# Patient Record
Sex: FEMALE | Race: BLACK OR AFRICAN AMERICAN | Employment: FULL TIME | URBAN - METROPOLITAN AREA
[De-identification: names, ages, dates, MRNs, and addresses within clinical notes are randomized per-mention and may not be internally consistent; named-entity substitution may affect disease eponyms.]

---

## 2017-03-23 ENCOUNTER — HOSPITAL ENCOUNTER (OUTPATIENT)
Dept: LAB | Age: 27
Discharge: HOME | End: 2017-03-23

## 2017-03-23 LAB — SENTARA SPECIMEN COL,SENBCF: NORMAL

## 2017-03-23 PROCEDURE — 99001 SPECIMEN HANDLING PT-LAB: CPT | Performed by: OBSTETRICS & GYNECOLOGY

## 2017-04-03 ENCOUNTER — ANESTHESIA EVENT (OUTPATIENT)
Dept: SURGERY | Age: 27
End: 2017-04-03
Payer: COMMERCIAL

## 2017-04-03 NOTE — H&P
Chief Complaint:      Diagnosis:  Squamous cell carcinoma of the cervix. Lupus. Hx of Stroke. Treatment:  12/15/14:  Cervical biopsy reveals squamous cell carcinoma, depth of invasion cannot be accurately determined. 12/24/14:  CT chest, abdomen, and pelvis with contrast revealing a cervical mass, 2 tiny ovoid hypodensities right kidney probably representing renal cysts, probable involuting cyst in the left ovary, no acute findings otherwise. 12/30/14 - EUA reveals infiltrating tumor measuring 5cm transversely and 7cm AP dimension. Status post 5 cycles Cisplatin 40mg/m2 with concomitant radiation therapy. Completed 3/20/15.  6/2/2015- EUA with cervical and vaginal biopsies, pathology negative for malignancy. 12/14/2015 colposcopy and biopsy revealing no evidence of malignancy, atypia related to radiation affect. 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 Diagnostic Studies:  03/04/15 - MRI of the pelvis reveals normal appearing cervix. No evidence of regional metastatic disease. Interval History:  Kami Farnsworth presents to the office today for follow up regarding her known history of squamous cell carcinoma of the cervix. She had completed chemotherapy and radiation in March 2015. Her last pap smear was normal.  At that last visit, she had slight irritation around the opening of the vagina, which she states has been present for five months. She was given Diflucan 100mg times 30 days which did not relieve her symptoms. She continues to have tenderness and itching on a daily basis. She denies any abdominal discomforts. Her bowel and bladder function remains normal.    Past Medical History:    Hypertension  Systemic lupus erythematosis  Stroke    Past Surgical History:  12/30/14 - EUA reveals infiltrating tumor measuring 5cm transversely and 7cm AP dimension.   EUA with cervical and vaginal biopsies 6/2/2015  Health Maintenance:  Screenings: Pap Smear on 11/28/2016; Pelvic Exam on 11/28/2016  Immunizations: Not given  Never smoker    Review of Systems:  Constitutional: No weight loss, no fever, no chills, no night sweats, energy level good. Cardiovascular:no chest pain, no palpitations, no syncope, no upper extremity edema, no lower extremity edema, no calf discomfort. Respiratory:  no cough, no hemoptysis, no dyspnea, no pleurisy, no wheezing. Breast:  no breast mass, no pain, no nipple discharge, no change in size, no change in shape. Gastrointestinal:  no abdominal pain, no nausea, no vomiting, no constipation, no hematochezia, no jaundice, no abdominal cramping, no hematemesis, no diarrhea, no melena, no dyspepsia, no dysphagia. Female urinary:  no dysuria, no hematuria, no nocturia, no urinary incontinence. Gynecological:  no vaginal discharge, no suprapubic pain, no abnormal vaginal bleeding, irritation and discomfort at opening to vagina, decreased libido. Musculoskeletal: no muscle pain, no swollen joints, no joint redness, no bone pain, no spine tenderness. Neurologic:  no confusion, no seizures, no syncope, no tremor, no speech change, no headache, no hiccups, no abnormal gait, no weakness, no sensory changes. Psychiatric:  no depression, no anxiety, concentration normal.  Endocrine:  no polyuria, no polydipsia, no thyroid disease symptoms, no hot flashes. Hematologic:  no epistaxis, no gingival bleeding, no petechiae, no ecchymosis. Lymphatic:  no lymphadenopathy, no lymphedema. Ob/Gyn History:    Family History:  Father: Prostate cancer; Mother: Breast cancer    Social History:      Vital Signs:  Vital signs:  HT: 61 in, WT: 198.5 lb,  Blood pressure: 126/74, Pulse: 105, Temperature: 98.2 F, Respirations: 17, O2 sat: 98%, Pain Scale: 0, Height: 59 in, Weight: 198.5 lb, BSA: 1.94, BMI: 40.09 kg/m2, BMI: 40.09 kg/m2    Physical Exam:    Constitutional:  normal appearance, no acute distress. Eyes:  conjunctivae normal, eyelids normal, PERRLA, anicteric.   Ears: nose, throat:  external ears and nose normal, hearing grossly normal, oropharynx unremarkable. Neck:  supple, no masses. Respiratory:  breathing comfortably, lungs clear to auscultation and percussion. Cardiovascular:  normal heart sounds, heart rhythm regular, no peripheral edema. Abdomen: no ascites, no masses, no tenderness, no hepatomegaly, no splenomegaly. Pelvic: Slight irregularity on the overlying skin involving the right labia minora, possible condyloma. Radiation change in the vagina pap smearobtained  Smooth fibrosis on bimanual exam.  No masses or nodularity on bimanual exam.  Accompanied by:  Female staff member. Rectal:  Anal sphincter tone is normal.  No rectal lesions are noted. Lymph nodes:  no cervical adenopathy, no axillary adenopathy, no supraclavicular adenopathy. Musculoskeletal:  normal muscle strength. Neurologic:  no focal motor deficit, normal gait, no abnormal mental status. Psychiatric:  oriented to person, time and place, mood and affect appropriate to situation, appropriate judgement and insight, memory intact. Assessment:  Slight Irregularity with persistent itching and burning involving the right labia minora not relieved with Diflucan        Plan:  1. I discussed the current situation in detail and I have recommended WLE of the affected area given continued pressence of irregularity involving the right labia minora and discomfort. 2.       I recommended that we make plans to go to the operating room for WLE of the affected area. She will need Lovenox 1.5mg/kg 5 days prior to the procedure and then bridge back to Coumadin. 3.       The details, risks, and postoperative recovery were reviewed as well as the possible need for additional intervention depending upon the final pathology report. Her questions were answered to her apparent satisfaction and we will proceed in the near future.     4.       I have reviewed the potential risks including risk of anesthesia, bleeding, and infection. She is in agreement with this plan and we will proceed in the near future. Laboratory:  (12/14/2015) WBC x 10^3/uL : 8.56; RBC x 10^6/uL : 3.85 (L); HGB g/dL : 10.2 (L); HCT % : 33.4 (L); MCV fL : 87; MCH pg : 26.4 (L); MCHC g/dL : 30.4 (L); RDW % : 14.7; PLT x 10^3/uL : 239; MPV fL : 5.1; Nicolasa % : 85.2 (H); LY % : 6.9 (L); MO % : 7.34; EO % : 0.08; BA % : 0.48; Nicolasa # (ANC) x 10^3/uL : 7.3 (H); LY # x 10^3/uL : 0.59 (L); MO # x 10^3/uL : 0.63; EO # x 10^3/uL : 0.01; BA # x 10^3/uL : 0.04  PT sec : 33.4 (H); INR : 2.8 (H); PT/INR indication/reason : recheck pt/inr on friday; Target INR : 2-3; Current Coumadin dose : coumadin on hold x3 days; New Coumadin dose : 7.5mg daily  (11/18/2016) PT INR lab result note : recheck in 1 week  (12/04/2013) aPTT sec : 25.8; APTT 1:1 normal plasma sec : 25.7; APTT-LA sec : 33.3; APTT 1:1 mix saline sec : 28.1; APTT 1:1 NP mix, 60 min, incub sec : 26.3; APTT 1:1 NP incub. mix control sec : 26.2; Dilute Dylon viper venom time sec : 29.1; Fibrinogen, quantitative mg/dL : 714 (H); Antithrombin III activity % : 135; Antithrombin III assay, antigenic % : 123; Activated protein C ratio : 2.6; Protein S, functional % : 67; Protein S, free % : 68; Protein S, total % : 124; Protein C, functional % : 159 (H); Protein C, total % : 163 (H); Lupus anticoagulant interpretation : COMMENT:  (12/14/2015) Glucose mg/dL : 98; BUN mg/dL : 21; Creatinine mg/dL : 0.9; Sodium mmol/L : 140; Potassium mmol/L : 4.3; Chloride mmol/L : 102; CO2 mmol/L : 24; Anion gap, mmol/L : 14; Calcium mg/dL : 9; Albumin g/dL : 3.9; Total protein g/dL : 6.5; Globulin g/dL : 2.6; A/G ratio : 1.5; Bilirubin, total mg/dL : <0.1 (L); Alkaline phosphatase U/L : 65; AST/SGOT U/L : 11;  ALT/SGPT U/L : 11; GFR non-African American, estimated mL/min/1.73m2 : >60.0; GFR African American, estimated mL/min/1.73m2 : >60.0  (10/17/2014) GFR estimate mL/min/1.73m2 : >60.0  (08/13/2014) BUN/Creatinine ratio : 19.3  (06/09/2014) GFR, estimated, iSTAT, non  adult mL/min/1.73m2 : 103  (12/04/2013) LDH U/L : 223 (H); Uric acid mg/dL : 9.3 (H); Phosphorus mg/dL : 5 (H)  (10/17/2014) Beta HCG, serum, quant mIU/mL : <1  (10/16/2015) Color (ua) : Yellow (Comments: Ref. Range: Straw, yellow, deborah); Appearance (ua) : Clear (Comments: Ref. Range: clear); Glucose (ua) : Negative (Comments: Ref. Range: Negative); Bilirubin (ua) : Negative (Comments: Ref. Range: Negative); Ketone (ua) : Negative (Comments: Ref. Range: Negative); Specific gravity (ua) : 1.025; Blood (ua) : Negative (Comments: Ref. Range: Negative); pH (ua) : 6; Protein (ua) : 300 (A) (Comments: Ref. Range: Negative); Urobilinogen (ua) : 0.2; Nitrite (ua) : Negative (Comments: Ref. Range: Negative); Leukocyte esterase (ua), qual : Negative (Comments: Ref. Range: Negative)  (06/09/2014) Protein, 24 hr urine, mg/24hr : 2302.5 (H); Creatinine, random urine mg/dL : 58.6 (Comments: TOTAL VOLUME: 2500 ML); Protein, total, random urine mg/dL : 92.1 (H) (Comments: TOTAL VOLUME: 2500 ML); Creatinine, 24 hr urine g/24hr : 1465 (Comments: TOTAL VOLUME: 2500 ML); Creatinine clearance mL/min : 127 (Comments: THE ABOVE RANGE IS BASED ON 1.73 SQUARE METER AVERAGE BODY SURFACE AREA.); Albumin, UPE % : 72.8; Alpha 1 globulin, UPE % : 4.8; Alpha 2 globulin, UPE % : 3.8; Beta globulin, UPE % : 14.9; Gamma globulin, UPE % : 3.7; M Serge %, UPE, 24 hr % : NOT OBSERVED (Comments: Units: %, . Ref. Range: NOT OBSERVED, TOTAL VOLUME: 2500 ML);  Immunofixation, 24 hr urine : COMMENT (Comments: AN APPARENT NORMAL IMMUNOFIXATION PATTERN.)    Current Medications:  CellCept (Mycophenolate Oral)  Coumadin (Warfarin Oral)  Diflucan (Fluconazole Oral)  Diflucan (Fluconazole Oral)  Estrace (Estradiol Vaginal Cream 0.01 % (0.1 mg/g))  Hydroxychloroquine Oral  Omeprazole Oral Delayed Release Capsule  Acetaminophen-Caff-Butalbital Oral 325 mg-40 mg-50 mg  Cyclobenzaprine Oral  Metoprolol Oral (Tartrate)  Multivitamins Oral  Mycophenolate Oral  Prednisone Oral  Simvastatin Oral  Telmisartan Oral  Valsartan Oral      Allergies & Adverse Reactions:  metronidazole  Nitrofuran Analogues  Vancomycin Analogues  Nitroimidazole Derivatives  Cephalexin  Cephalosporins  VANCOMYCIN HCL  NITROFURANTOIN MONOHYDRATE/MACROCRYSTALS    Problem List:  Antiphospholipid syndrome (disorder)  Malignant tumor of cervix (disorder)  Abscess of abdominal wall  Candida of vagina  Cerebrovascular accident (disorder)  Cervicovaginal cytology: High grade squamous intraepithelial lesion or carcinoma (finding)  Dehydration (disorder)    New Orders:  03/13/2017, RTC MD, Perform Date: Prior to Next Visit, Instructions: Post op 2 weeks after 4/4/17 surgery    Quiana East NP    Other Physicians:  Lanie Mcpherson MD, Randi Israel MD (Rheumatology), Marcel Iqbal MD (Cardiology), Vern Leary MD (Neurology), Carrillo Stevenson MD (Radiation Oncology)      Send copy of note to:  MD Marcel Reyes MD Synetta Headings, MD Paulo Hives, MD Cathye Graver, MD        Electronically signed by Rodolfo Zaragoza MD 03/20/2017 12:56 EDT

## 2017-04-04 ENCOUNTER — HOSPITAL ENCOUNTER (OUTPATIENT)
Age: 27
Setting detail: OUTPATIENT SURGERY
Discharge: HOME | End: 2017-04-04
Attending: OBSTETRICS & GYNECOLOGY | Admitting: OBSTETRICS & GYNECOLOGY
Payer: COMMERCIAL

## 2017-04-04 ENCOUNTER — ANESTHESIA (OUTPATIENT)
Dept: SURGERY | Age: 27
End: 2017-04-04
Payer: COMMERCIAL

## 2017-04-04 VITALS
SYSTOLIC BLOOD PRESSURE: 160 MMHG | RESPIRATION RATE: 16 BRPM | DIASTOLIC BLOOD PRESSURE: 65 MMHG | OXYGEN SATURATION: 99 % | TEMPERATURE: 97.5 F | HEIGHT: 59 IN | BODY MASS INDEX: 39.31 KG/M2 | HEART RATE: 70 BPM | WEIGHT: 195 LBS

## 2017-04-04 PROBLEM — N90.89 VULVAR LESION: Status: ACTIVE | Noted: 2017-04-04

## 2017-04-04 LAB
ATRIAL RATE: 79 BPM
CALCULATED P AXIS, ECG09: 17 DEGREES
CALCULATED R AXIS, ECG10: 15 DEGREES
CALCULATED T AXIS, ECG11: 19 DEGREES
DIAGNOSIS, 93000: NORMAL
HCG UR QL: NEGATIVE
P-R INTERVAL, ECG05: 136 MS
Q-T INTERVAL, ECG07: 358 MS
QRS DURATION, ECG06: 82 MS
QTC CALCULATION (BEZET), ECG08: 410 MS
VENTRICULAR RATE, ECG03: 79 BPM

## 2017-04-04 PROCEDURE — 88309 TISSUE EXAM BY PATHOLOGIST: CPT | Performed by: OBSTETRICS & GYNECOLOGY

## 2017-04-04 PROCEDURE — 74011000250 HC RX REV CODE- 250

## 2017-04-04 PROCEDURE — 76210000017 HC OR PH I REC 1.5 TO 2 HR: Performed by: OBSTETRICS & GYNECOLOGY

## 2017-04-04 PROCEDURE — 77030034850: Performed by: OBSTETRICS & GYNECOLOGY

## 2017-04-04 PROCEDURE — 77030031139 HC SUT VCRL2 J&J -A: Performed by: OBSTETRICS & GYNECOLOGY

## 2017-04-04 PROCEDURE — 77030010938 HC CLP LIG TELE -A: Performed by: OBSTETRICS & GYNECOLOGY

## 2017-04-04 PROCEDURE — 74011000250 HC RX REV CODE- 250: Performed by: OBSTETRICS & GYNECOLOGY

## 2017-04-04 PROCEDURE — 74011250636 HC RX REV CODE- 250/636: Performed by: NURSE ANESTHETIST, CERTIFIED REGISTERED

## 2017-04-04 PROCEDURE — 74011000258 HC RX REV CODE- 258: Performed by: OBSTETRICS & GYNECOLOGY

## 2017-04-04 PROCEDURE — 77030032490 HC SLV COMPR SCD KNE COVD -B: Performed by: OBSTETRICS & GYNECOLOGY

## 2017-04-04 PROCEDURE — 74011250636 HC RX REV CODE- 250/636

## 2017-04-04 PROCEDURE — 77030010516 HC APPL HEMA CLP TELE -B: Performed by: OBSTETRICS & GYNECOLOGY

## 2017-04-04 PROCEDURE — 77030018702 HC CLP LIG TI TELE -A: Performed by: OBSTETRICS & GYNECOLOGY

## 2017-04-04 PROCEDURE — 77030003028 HC SUT VCRL J&J -A: Performed by: OBSTETRICS & GYNECOLOGY

## 2017-04-04 PROCEDURE — 76060000032 HC ANESTHESIA 0.5 TO 1 HR: Performed by: OBSTETRICS & GYNECOLOGY

## 2017-04-04 PROCEDURE — 93005 ELECTROCARDIOGRAM TRACING: CPT

## 2017-04-04 PROCEDURE — 77030002982 HC SUT POLYSRB J&J -A: Performed by: OBSTETRICS & GYNECOLOGY

## 2017-04-04 PROCEDURE — 77030012414: Performed by: OBSTETRICS & GYNECOLOGY

## 2017-04-04 PROCEDURE — 77030003029 HC SUT VCRL J&J -B: Performed by: OBSTETRICS & GYNECOLOGY

## 2017-04-04 PROCEDURE — 77030008462 HC STPLR SKN PROX J&J -A: Performed by: OBSTETRICS & GYNECOLOGY

## 2017-04-04 PROCEDURE — 77030002996 HC SUT SLK J&J -A: Performed by: OBSTETRICS & GYNECOLOGY

## 2017-04-04 PROCEDURE — 77030011248 HC DRN WND RESERV CARD -A: Performed by: OBSTETRICS & GYNECOLOGY

## 2017-04-04 PROCEDURE — 81025 URINE PREGNANCY TEST: CPT

## 2017-04-04 PROCEDURE — 88305 TISSUE EXAM BY PATHOLOGIST: CPT | Performed by: OBSTETRICS & GYNECOLOGY

## 2017-04-04 PROCEDURE — 76210000020 HC REC RM PH II FIRST 0.5 HR: Performed by: OBSTETRICS & GYNECOLOGY

## 2017-04-04 PROCEDURE — 74011250636 HC RX REV CODE- 250/636: Performed by: ANESTHESIOLOGY

## 2017-04-04 PROCEDURE — 77030018846 HC SOL IRR STRL H20 ICUM -A: Performed by: OBSTETRICS & GYNECOLOGY

## 2017-04-04 PROCEDURE — 77030010507 HC ADH SKN DERMBND J&J -B: Performed by: OBSTETRICS & GYNECOLOGY

## 2017-04-04 PROCEDURE — 77030011265 HC ELECTRD BLD HEX COVD -A: Performed by: OBSTETRICS & GYNECOLOGY

## 2017-04-04 PROCEDURE — 77030012510 HC MSK AIRWY LMA TELE -B: Performed by: ANESTHESIOLOGY

## 2017-04-04 PROCEDURE — 74011250637 HC RX REV CODE- 250/637: Performed by: NURSE ANESTHETIST, CERTIFIED REGISTERED

## 2017-04-04 PROCEDURE — 76010000138 HC OR TIME 0.5 TO 1 HR: Performed by: OBSTETRICS & GYNECOLOGY

## 2017-04-04 PROCEDURE — 77030018836 HC SOL IRR NACL ICUM -A: Performed by: OBSTETRICS & GYNECOLOGY

## 2017-04-04 RX ORDER — INSULIN LISPRO 100 [IU]/ML
INJECTION, SOLUTION INTRAVENOUS; SUBCUTANEOUS ONCE
Status: DISCONTINUED | OUTPATIENT
Start: 2017-04-05 | End: 2017-04-04 | Stop reason: HOSPADM

## 2017-04-04 RX ORDER — ONDANSETRON 2 MG/ML
INJECTION INTRAMUSCULAR; INTRAVENOUS AS NEEDED
Status: DISCONTINUED | OUTPATIENT
Start: 2017-04-04 | End: 2017-04-04 | Stop reason: HOSPADM

## 2017-04-04 RX ORDER — FENTANYL CITRATE 50 UG/ML
25 INJECTION, SOLUTION INTRAMUSCULAR; INTRAVENOUS
Status: DISCONTINUED | OUTPATIENT
Start: 2017-04-04 | End: 2017-04-04 | Stop reason: HOSPADM

## 2017-04-04 RX ORDER — OXYCODONE AND ACETAMINOPHEN 5; 325 MG/1; MG/1
1 TABLET ORAL
Qty: 30 TAB | Refills: 0 | Status: SHIPPED | OUTPATIENT
Start: 2017-04-04 | End: 2020-10-20

## 2017-04-04 RX ORDER — BUPIVACAINE HYDROCHLORIDE AND EPINEPHRINE 5; 5 MG/ML; UG/ML
INJECTION, SOLUTION EPIDURAL; INTRACAUDAL; PERINEURAL AS NEEDED
Status: DISCONTINUED | OUTPATIENT
Start: 2017-04-04 | End: 2017-04-04 | Stop reason: HOSPADM

## 2017-04-04 RX ORDER — LIDOCAINE HYDROCHLORIDE 20 MG/ML
INJECTION, SOLUTION EPIDURAL; INFILTRATION; INTRACAUDAL; PERINEURAL AS NEEDED
Status: DISCONTINUED | OUTPATIENT
Start: 2017-04-04 | End: 2017-04-04 | Stop reason: HOSPADM

## 2017-04-04 RX ORDER — KETOROLAC TROMETHAMINE 30 MG/ML
INJECTION, SOLUTION INTRAMUSCULAR; INTRAVENOUS AS NEEDED
Status: DISCONTINUED | OUTPATIENT
Start: 2017-04-04 | End: 2017-04-04 | Stop reason: HOSPADM

## 2017-04-04 RX ORDER — FENTANYL CITRATE 50 UG/ML
INJECTION, SOLUTION INTRAMUSCULAR; INTRAVENOUS AS NEEDED
Status: DISCONTINUED | OUTPATIENT
Start: 2017-04-04 | End: 2017-04-04 | Stop reason: HOSPADM

## 2017-04-04 RX ORDER — SODIUM CHLORIDE, SODIUM LACTATE, POTASSIUM CHLORIDE, CALCIUM CHLORIDE 600; 310; 30; 20 MG/100ML; MG/100ML; MG/100ML; MG/100ML
50 INJECTION, SOLUTION INTRAVENOUS CONTINUOUS
Status: DISCONTINUED | OUTPATIENT
Start: 2017-04-04 | End: 2017-04-04 | Stop reason: HOSPADM

## 2017-04-04 RX ORDER — CLINDAMYCIN PHOSPHATE 150 MG/ML
INJECTION, SOLUTION INTRAVENOUS
Status: DISCONTINUED
Start: 2017-04-04 | End: 2017-04-04 | Stop reason: HOSPADM

## 2017-04-04 RX ORDER — GENTAMICIN SULFATE 60 MG/50ML
120 INJECTION, SOLUTION INTRAVENOUS ONCE
Status: DISCONTINUED | OUTPATIENT
Start: 2017-04-04 | End: 2017-04-04 | Stop reason: HOSPADM

## 2017-04-04 RX ORDER — GENTAMICIN SULFATE 60 MG/50ML
INJECTION, SOLUTION INTRAVENOUS
Status: DISCONTINUED
Start: 2017-04-04 | End: 2017-04-04

## 2017-04-04 RX ORDER — MIDAZOLAM HYDROCHLORIDE 1 MG/ML
INJECTION, SOLUTION INTRAMUSCULAR; INTRAVENOUS AS NEEDED
Status: DISCONTINUED | OUTPATIENT
Start: 2017-04-04 | End: 2017-04-04 | Stop reason: HOSPADM

## 2017-04-04 RX ORDER — FAMOTIDINE 20 MG/1
20 TABLET, FILM COATED ORAL ONCE
Status: COMPLETED | OUTPATIENT
Start: 2017-04-05 | End: 2017-04-04

## 2017-04-04 RX ORDER — DEXAMETHASONE SODIUM PHOSPHATE 4 MG/ML
INJECTION, SOLUTION INTRA-ARTICULAR; INTRALESIONAL; INTRAMUSCULAR; INTRAVENOUS; SOFT TISSUE AS NEEDED
Status: DISCONTINUED | OUTPATIENT
Start: 2017-04-04 | End: 2017-04-04 | Stop reason: HOSPADM

## 2017-04-04 RX ORDER — ONDANSETRON 2 MG/ML
4 INJECTION INTRAMUSCULAR; INTRAVENOUS
Status: COMPLETED | OUTPATIENT
Start: 2017-04-04 | End: 2017-04-04

## 2017-04-04 RX ORDER — PROPOFOL 10 MG/ML
INJECTION, EMULSION INTRAVENOUS AS NEEDED
Status: DISCONTINUED | OUTPATIENT
Start: 2017-04-04 | End: 2017-04-04 | Stop reason: HOSPADM

## 2017-04-04 RX ORDER — DIPHENHYDRAMINE HYDROCHLORIDE 50 MG/ML
25 INJECTION, SOLUTION INTRAMUSCULAR; INTRAVENOUS
Status: DISCONTINUED | OUTPATIENT
Start: 2017-04-04 | End: 2017-04-04 | Stop reason: HOSPADM

## 2017-04-04 RX ORDER — HYDROMORPHONE HYDROCHLORIDE 2 MG/ML
0.5 INJECTION, SOLUTION INTRAMUSCULAR; INTRAVENOUS; SUBCUTANEOUS
Status: DISCONTINUED | OUTPATIENT
Start: 2017-04-04 | End: 2017-04-04 | Stop reason: HOSPADM

## 2017-04-04 RX ORDER — SODIUM CHLORIDE, SODIUM LACTATE, POTASSIUM CHLORIDE, CALCIUM CHLORIDE 600; 310; 30; 20 MG/100ML; MG/100ML; MG/100ML; MG/100ML
100 INJECTION, SOLUTION INTRAVENOUS CONTINUOUS
Status: DISCONTINUED | OUTPATIENT
Start: 2017-04-05 | End: 2017-04-04 | Stop reason: HOSPADM

## 2017-04-04 RX ORDER — FAMOTIDINE 20 MG/1
TABLET, FILM COATED ORAL
Status: DISCONTINUED
Start: 2017-04-04 | End: 2017-04-04 | Stop reason: HOSPADM

## 2017-04-04 RX ORDER — SODIUM CHLORIDE 900 MG/100ML
INJECTION INTRAVENOUS
Status: DISCONTINUED
Start: 2017-04-04 | End: 2017-04-04 | Stop reason: HOSPADM

## 2017-04-04 RX ORDER — OXYCODONE AND ACETAMINOPHEN 5; 325 MG/1; MG/1
1 TABLET ORAL AS NEEDED
Status: DISCONTINUED | OUTPATIENT
Start: 2017-04-04 | End: 2017-04-04 | Stop reason: HOSPADM

## 2017-04-04 RX ADMIN — SODIUM CHLORIDE, SODIUM LACTATE, POTASSIUM CHLORIDE, AND CALCIUM CHLORIDE 100 ML/HR: 600; 310; 30; 20 INJECTION, SOLUTION INTRAVENOUS at 10:18

## 2017-04-04 RX ADMIN — ONDANSETRON 4 MG: 2 INJECTION INTRAMUSCULAR; INTRAVENOUS at 10:54

## 2017-04-04 RX ADMIN — ONDANSETRON 4 MG: 2 INJECTION INTRAMUSCULAR; INTRAVENOUS at 11:32

## 2017-04-04 RX ADMIN — FENTANYL CITRATE 50 MCG: 50 INJECTION, SOLUTION INTRAMUSCULAR; INTRAVENOUS at 10:27

## 2017-04-04 RX ADMIN — FENTANYL CITRATE 50 MCG: 50 INJECTION, SOLUTION INTRAMUSCULAR; INTRAVENOUS at 10:54

## 2017-04-04 RX ADMIN — DEXAMETHASONE SODIUM PHOSPHATE 4 MG: 4 INJECTION, SOLUTION INTRA-ARTICULAR; INTRALESIONAL; INTRAMUSCULAR; INTRAVENOUS; SOFT TISSUE at 10:27

## 2017-04-04 RX ADMIN — FAMOTIDINE 20 MG: 20 TABLET, FILM COATED ORAL at 10:11

## 2017-04-04 RX ADMIN — LIDOCAINE HYDROCHLORIDE 60 MG: 20 INJECTION, SOLUTION EPIDURAL; INFILTRATION; INTRACAUDAL; PERINEURAL at 10:27

## 2017-04-04 RX ADMIN — PROPOFOL 200 MG: 10 INJECTION, EMULSION INTRAVENOUS at 10:27

## 2017-04-04 RX ADMIN — FENTANYL CITRATE 50 MCG: 50 INJECTION, SOLUTION INTRAMUSCULAR; INTRAVENOUS at 11:00

## 2017-04-04 RX ADMIN — MIDAZOLAM HYDROCHLORIDE 2 MG: 1 INJECTION, SOLUTION INTRAMUSCULAR; INTRAVENOUS at 10:23

## 2017-04-04 RX ADMIN — MEPERIDINE HYDROCHLORIDE 12.5 MG: 50 INJECTION, SOLUTION INTRAMUSCULAR; INTRAVENOUS; SUBCUTANEOUS at 11:32

## 2017-04-04 RX ADMIN — SODIUM CHLORIDE 900 MG: 900 INJECTION, SOLUTION INTRAVENOUS at 10:30

## 2017-04-04 RX ADMIN — FENTANYL CITRATE 50 MCG: 50 INJECTION, SOLUTION INTRAMUSCULAR; INTRAVENOUS at 10:45

## 2017-04-04 RX ADMIN — FENTANYL CITRATE 50 MCG: 50 INJECTION, SOLUTION INTRAMUSCULAR; INTRAVENOUS at 10:30

## 2017-04-04 RX ADMIN — KETOROLAC TROMETHAMINE 30 MG: 30 INJECTION, SOLUTION INTRAMUSCULAR; INTRAVENOUS at 11:03

## 2017-04-04 NOTE — DISCHARGE INSTRUCTIONS
DISCHARGE SUMMARY from Nurse    The following personal items are in your possession at time of discharge:    Dental Appliances: None  Visual Aid: With patient     Home Medications: None  Jewelry: None  Clothing: Shirt, Pajamas, Footwear                PATIENT INSTRUCTIONS:    After general anesthesia or intravenous sedation, for 24 hours or while taking prescription Narcotics:  · Limit your activities  · Do not drive and operate hazardous machinery  · Do not make important personal or business decisions  · Do  not drink alcoholic beverages  · If you have not urinated within 8 hours after discharge, please contact your surgeon on call. Report the following to your surgeon:  · Excessive pain, swelling, redness or odor of or around the surgical area  · Temperature over 100.5  · Nausea and vomiting lasting longer than 4 hours or if unable to take medications  · Any signs of decreased circulation or nerve impairment to extremity: change in color, persistent  numbness, tingling, coldness or increase pain  · Any questions        What to do at Home:  Recommended activity: Activity as tolerated and no driving for today. These are general instructions for a healthy lifestyle:    No smoking/ No tobacco products/ Avoid exposure to second hand smoke    Surgeon General's Warning:  Quitting smoking now greatly reduces serious risk to your health. Obesity, smoking, and sedentary lifestyle greatly increases your risk for illness    A healthy diet, regular physical exercise & weight monitoring are important for maintaining a healthy lifestyle    You may be retaining fluid if you have a history of heart failure or if you experience any of the following symptoms:  Weight gain of 3 pounds or more overnight or 5 pounds in a week, increased swelling in our hands or feet or shortness of breath while lying flat in bed.   Please call your doctor as soon as you notice any of these symptoms; do not wait until your next office visit. Recognize signs and symptoms of STROKE:    F-face looks uneven    A-arms unable to move or move unevenly    S-speech slurred or non-existent    T-time-call 911 as soon as signs and symptoms begin-DO NOT go       Back to bed or wait to see if you get better-TIME IS BRAIN. Warning Signs of HEART ATTACK     Call 911 if you have these symptoms:   Chest discomfort. Most heart attacks involve discomfort in the center of the chest that lasts more than a few minutes, or that goes away and comes back. It can feel like uncomfortable pressure, squeezing, fullness, or pain.  Discomfort in other areas of the upper body. Symptoms can include pain or discomfort in one or both arms, the back, neck, jaw, or stomach.  Shortness of breath with or without chest discomfort.  Other signs may include breaking out in a cold sweat, nausea, or lightheadedness. Don't wait more than five minutes to call 911 - MINUTES MATTER! Fast action can save your life. Calling 911 is almost always the fastest way to get lifesaving treatment. Emergency Medical Services staff can begin treatment when they arrive -- up to an hour sooner than if someone gets to the hospital by car. The discharge information has been reviewed with the patient. The patient verbalized understanding. Discharge medications reviewed with the patient and appropriate educational materials and side effects teaching were provided. Patient armband removed and given to patient to take home.   Patient was informed of the privacy risks if armband lost or stolen

## 2017-04-04 NOTE — ANESTHESIA POSTPROCEDURE EVALUATION
Post-Anesthesia Evaluation and Assessment    Patient: Justo Mahoney MRN: [id number]  SSN: [id number]    YOB: 1990  Age: 27 y.o. Sex: female       Cardiovascular Function/Vital Signs  Visit Vitals    /55    Pulse 100    Temp 36.4 °C (97.5 °F)    Resp 23    Ht 4' 11\" (1.499 m)    Wt 88.5 kg (195 lb)    SpO2 92%    BMI 39.39 kg/m2       Patient is status post general anesthesia for Procedure(s):  wide local excision of vulva. Nausea/Vomiting: None    Postoperative hydration reviewed and adequate. Pain:  Pain Scale 1: Numeric (0 - 10) (04/04/17 1138)  Pain Intensity 1: 0 (04/04/17 1138)   Managed    Neurological Status:   Neuro (WDL): Within Defined Limits (04/04/17 1138)   At baseline    Mental Status and Level of Consciousness: Arousable    Pulmonary Status:   O2 Device: Room air (04/04/17 1138)   Adequate oxygenation and airway patent    Complications related to anesthesia: None    Post-anesthesia assessment completed.  No concerns    Signed By: Gaviota Wade MD     April 4, 2017

## 2017-04-04 NOTE — IP AVS SNAPSHOT
[address] [address] Gill Muller  
[phone] Patient: Azucena Rodriguez MRN: [id number] :1990 You are allergic to the following Allergen Reactions Flagyl (Metronidazole) Nausea Only Keflex (Cephalexin) Rash Macrobid (Nitrofurantoin Monohyd/M-Cryst) Nausea Only Vancomycin Nausea Only Recent Documentation Height Weight BMI OB Status Smoking Status 1.499 m 88.5 kg 39.39 kg/m2 Menopause Never Smoker Emergency Contacts Name Discharge Info Relation Home Work Mobile Shay Syed DISCHARGE CAREGIVER [3] Spouse [3]   [phone] About your hospitalization You were admitted on:  2017 You last received care in the:  [facility] PHASE 2 RECOVERY You were discharged on:  2017 Unit phone number:  [phone] Why you were hospitalized Your primary diagnosis was:  Not on File Your diagnoses also included:  Vulvar Lesion Providers Seen During Your Hospitalizations Provider Role Specialty Primary office phone Alyssa Baron MD Attending Provider Gynecologic Oncology [phone] Your Primary Care Physician (PCP) Primary Care Physician Office Phone Office Fax Jeanette Salmeron [phone] Follow-up Information Follow up With Details Comments Contact Info Nirmala Styles MD   [address] [address] [address] [facility] [place] [address] 
[phone] Current Discharge Medication List  
  
CONTINUE these medications which have CHANGED Dose & Instructions Dispensing Information Comments Morning Noon Evening Bedtime * oxyCODONE-acetaminophen 5-325 mg per tablet Commonly known as:  PERCOCET What changed:  Another medication with the same name was added. Make sure you understand how and when to take each. Your last dose was: Your next dose is:    
   
   
 Dose:  1 Tab Take 1 Tab by mouth every four (4) hours as needed for Pain. Max Daily Amount: 6 Tabs. Quantity:  24 Tab Refills:  0  
     
   
   
   
  
 * oxyCODONE-acetaminophen 5-325 mg per tablet Commonly known as:  PERCOCET What changed: You were already taking a medication with the same name, and this prescription was added. Make sure you understand how and when to take each. Your last dose was: Your next dose is:    
   
   
 Dose:  1 Tab Take 1 Tab by mouth every four (4) hours as needed for Pain. Max Daily Amount: 6 Tabs. Quantity:  30 Tab Refills:  0  
     
   
   
   
  
 * Notice: This list has 2 medication(s) that are the same as other medications prescribed for you. Read the directions carefully, and ask your doctor or other care provider to review them with you. CONTINUE these medications which have NOT CHANGED Dose & Instructions Dispensing Information Comments Morning Noon Evening Bedtime  
 amLODIPine 5 mg tablet Commonly known as:  Celi Rodgers Your last dose was: Your next dose is:    
   
   
 Dose:  5 mg Take 5 mg by mouth daily. Refills:  0  
     
   
   
   
  
 enoxaparin 40 mg/0.4 mL Commonly known as:  LOVENOX Your last dose was: Your next dose is:    
   
   
 Dose:  40 mg  
40 mg by SubCUTAneous route daily. Refills:  0  
     
   
   
   
  
 mycophenolate 500 mg tablet Commonly known as:  CELLCEPT Your last dose was: Your next dose is:    
   
   
 Dose:  500 mg Take 500 mg by mouth two (2) times a day. Refills:  0  
     
   
   
   
  
 predniSONE 10 mg tablet Commonly known as:  Myranda Owens Your last dose was: Your next dose is:    
   
   
 Dose:  8 mg Take 8 mg by mouth daily (with breakfast). Refills:  0  
     
   
   
   
  
 simvastatin 20 mg tablet Commonly known as:  ZOCOR Your last dose was: Your next dose is: Take  by mouth nightly. Refills:  0  
     
   
   
   
  
 valsartan 160 mg tablet Commonly known as:  DIOVAN Your last dose was: Your next dose is: Take  by mouth daily. Refills:  0  
     
   
   
   
  
 warfarin 6 mg tablet Commonly known as:  COUMADIN Your last dose was: Your next dose is:    
   
   
 Dose:  7.5 mg Take 7.5 mg by mouth daily. Refills:  0 Where to Get Your Medications Information on where to get these meds will be given to you by the nurse or doctor. ! Ask your nurse or doctor about these medications  
  oxyCODONE-acetaminophen 5-325 mg per tablet Discharge Instructions DISCHARGE SUMMARY from Nurse The following personal items are in your possession at time of discharge: 
 
Dental Appliances: None Visual Aid: With patient Home Medications: None Jewelry: None Clothing: Juan Haymaker, Footwear PATIENT INSTRUCTIONS: 
 
After general anesthesia or intravenous sedation, for 24 hours or while taking prescription Narcotics: · Limit your activities · Do not drive and operate hazardous machinery · Do not make important personal or business decisions · Do  not drink alcoholic beverages · If you have not urinated within 8 hours after discharge, please contact your surgeon on call. Report the following to your surgeon: 
· Excessive pain, swelling, redness or odor of or around the surgical area · Temperature over 100.5 · Nausea and vomiting lasting longer than 4 hours or if unable to take medications · Any signs of decreased circulation or nerve impairment to extremity: change in color, persistent  numbness, tingling, coldness or increase pain · Any questions What to do at Home: 
Recommended activity: Activity as tolerated and no driving for today. These are general instructions for a healthy lifestyle: No smoking/ No tobacco products/ Avoid exposure to second hand smoke Surgeon General's Warning:  Quitting smoking now greatly reduces serious risk to your health. Obesity, smoking, and sedentary lifestyle greatly increases your risk for illness A healthy diet, regular physical exercise & weight monitoring are important for maintaining a healthy lifestyle You may be retaining fluid if you have a history of heart failure or if you experience any of the following symptoms:  Weight gain of 3 pounds or more overnight or 5 pounds in a week, increased swelling in our hands or feet or shortness of breath while lying flat in bed. Please call your doctor as soon as you notice any of these symptoms; do not wait until your next office visit. Recognize signs and symptoms of STROKE: 
 
F-face looks uneven A-arms unable to move or move unevenly S-speech slurred or non-existent T-time-call 911 as soon as signs and symptoms begin-DO NOT go Back to bed or wait to see if you get better-TIME IS BRAIN. Warning Signs of HEART ATTACK Call 911 if you have these symptoms: 
? Chest discomfort. Most heart attacks involve discomfort in the center of the chest that lasts more than a few minutes, or that goes away and comes back. It can feel like uncomfortable pressure, squeezing, fullness, or pain. ? Discomfort in other areas of the upper body. Symptoms can include pain or discomfort in one or both arms, the back, neck, jaw, or stomach. ? Shortness of breath with or without chest discomfort. ? Other signs may include breaking out in a cold sweat, nausea, or lightheadedness. Don't wait more than five minutes to call [address]! Fast action can save your life. Calling 911 is almost always the fastest way to get lifesaving treatment. Emergency Medical Services staff can begin treatment when they arrive  up to an hour sooner than if someone gets to the hospital by car. The discharge information has been reviewed with the patient. The patient verbalized understanding. Discharge medications reviewed with the patient and appropriate educational materials and side effects teaching were provided. Patient armband removed and given to patient to take home. Patient was informed of the privacy risks if armband lost or stolen Discharge Orders None Introducing [facility] SERVICES! Fawn Cuadra introduces [employer] patient portal. Now you can access parts of your medical record, email your doctor's office, and request medication refills online. 1. In your internet browser, go to [email or web address] 2. Click on the First Time User? Click Here link in the Sign In box. You will see the New Member Sign Up page. 3. Enter your [employer] Access Code exactly as it appears below. You will not need to use this code after youve completed the sign-up process. If you do not sign up before the expiration date, you must request a new code. · [employer] Access Code: [email or web address] Expires: 6/21/2017  3:34 PM 
 
4. Enter the last four digits of your Social Security Number (xxxx) and Date of Birth (mm/dd/yyyy) as indicated and click Submit. You will be taken to the next sign-up page. 5. Create a [employer] ID. This will be your [employer] login ID and cannot be changed, so think of one that is secure and easy to remember. 6. Create a [employer] password. You can change your password at any time. 7. Enter your Password Reset Question and Answer. This can be used at a later time if you forget your password. 8. Enter your e-mail address. You will receive e-mail notification when new information is available in [address] 9. Click Sign Up. You can now view and download portions of your medical record. 10. Click the Download Summary menu link to download a portable copy of your medical information.  
 
If you have questions, please visit the Frequently Asked Questions section of the [facility]. Remember, MyChart is NOT to be used for urgent needs. For medical emergencies, dial 911. Now available from your iPhone and Android! General Information Please provide this summary of care documentation to your next provider. Patient Signature:  ____________________________________________________________ Date:  ____________________________________________________________  
  
Lajuanda Rachell Provider Signature:  ____________________________________________________________ Date:  ____________________________________________________________

## 2017-04-04 NOTE — PERIOP NOTES
1108  Patient received in PACU and connected to monitors. Vital signs stable. RN at bedside. Will continue to monitor. 1132  Demerol given for rigors per MAR.    1142  Shaking has subsided. 1145  Pt taking ice chips.

## 2017-04-04 NOTE — PERIOP NOTES
1150  Assumed care of pt. Update for lunch relief given by Oralia Parry RN.     8059  Family updated by Oralia Parry RN.

## 2017-04-04 NOTE — ANESTHESIA PREPROCEDURE EVALUATION
Anesthetic History   No history of anesthetic complications            Review of Systems / Medical History  Patient summary reviewed and pertinent labs reviewed    Pulmonary  Within defined limits            Pertinent negatives: No smoker     Neuro/Psych       CVA  TIA     Cardiovascular    Hypertension                   GI/Hepatic/Renal  Within defined limits              Endo/Other  Within defined limits           Other Findings   Comments:   Risk Factors for Postoperative nausea/vomiting:       History of postoperative nausea/vomiting? NO       Female? YES       Motion sickness? NO       Intended opioid administration for postoperative analgesia?   NO           Physical Exam    Airway  Mallampati: II  TM Distance: 4 - 6 cm  Neck ROM: normal range of motion   Mouth opening: Normal     Cardiovascular  Regular rate and rhythm,  S1 and S2 normal,  no murmur, click, rub, or gallop             Dental  No notable dental hx       Pulmonary  Breath sounds clear to auscultation               Abdominal  Abdominal exam normal       Other Findings            Anesthetic Plan    ASA: 3  Anesthesia type: general          Induction: Intravenous  Anesthetic plan and risks discussed with: Patient

## 2017-04-04 NOTE — OP NOTES
Operative Report    Patient: Felicia Katz MRN: [id number]  SSN: [id number]    YOB: 1990  Age: 27 y.o. Sex: female       Date of Surgery: 4/4/2017     Preoperative Diagnosis:    right vulvar lesion     Postoperative Diagnosis:    right vulvar lesion     Procedure: Procedure(s):  wide local excision of vulva    Specimens:    ID Type Source Tests Collected by Time Destination   1 : right labia minora     Lamar Courtney MD 4/4/2017 1049 Pathology       Surgeon: Lamar Courtney MD    Assistant: MASOUD Villanueva, present and scrubbed to assist with exposure    Anesthesia: General    Complications: none    Estimated Blood Loss: minimal    Findings: 2cm slightly raised lesion occupying the right labia minora    Technique: After the patient was identified in the operating room, she was placed under general anesthesia by the anesthesia team. She was sterilely prepped and draped in the lithotomy position. The lesion was outlined with a marking pen, creating an ellipse with a 5-10mm visible margin circumferentially. The area was liberally infiltrated with 0.5% marcaine. The skin was incised with a scalpel. Electrocautery was used to dissect the subcutaneous tissue maintaining a 5mm deep margin. The specimen was sent to pathology. Hemostasis was achieved with electrocautery. The wound was closed with interrupted inverted 3-0 vicryl. Dermabond was applied. The patient was then awakened and transported to the recovery room in stable condition. All needle, sponge, and instrument counts were noted to be correct at the end of the case.           Signed By:  Lamar Courtney MD     April 4, 2017

## 2017-04-04 NOTE — ANESTHESIA POSTPROCEDURE EVALUATION
Post-Anesthesia Evaluation and Assessment    Patient: Zan Stone MRN: [id number]  SSN: [id number]    YOB: 1990  Age: 27 y.o. Sex: female       Cardiovascular Function/Vital Signs  Visit Vitals    /55    Pulse 100    Temp 36.4 °C (97.5 °F)    Resp 23    Ht 4' 11\" (1.499 m)    Wt 88.5 kg (195 lb)    SpO2 92%    BMI 39.39 kg/m2       Patient is status post general anesthesia for Procedure(s):  wide local excision of vulva. Nausea/Vomiting: None    Postoperative hydration reviewed and adequate. Pain:  Pain Scale 1: Numeric (0 - 10) (04/04/17 1138)  Pain Intensity 1: 0 (04/04/17 1138)   Managed    Neurological Status:   Neuro (WDL): Within Defined Limits (04/04/17 1138)   At baseline    Mental Status and Level of Consciousness: Arousable    Pulmonary Status:   O2 Device: Room air (04/04/17 1138)   Adequate oxygenation and airway patent    Complications related to anesthesia: None    Post-anesthesia assessment completed.  No concerns    Signed By: Maggi Denney CRNA     April 4, 2017

## 2017-09-19 ENCOUNTER — ANESTHESIA EVENT (OUTPATIENT)
Dept: SURGERY | Age: 27
End: 2017-09-19
Payer: COMMERCIAL

## 2017-09-20 ENCOUNTER — ANESTHESIA (OUTPATIENT)
Dept: SURGERY | Age: 27
End: 2017-09-20
Payer: COMMERCIAL

## 2017-09-20 ENCOUNTER — HOSPITAL ENCOUNTER (OUTPATIENT)
Age: 27
Setting detail: OUTPATIENT SURGERY
Discharge: HOME | End: 2017-09-20
Attending: STUDENT IN AN ORGANIZED HEALTH CARE EDUCATION/TRAINING PROGRAM | Admitting: STUDENT IN AN ORGANIZED HEALTH CARE EDUCATION/TRAINING PROGRAM
Payer: COMMERCIAL

## 2017-09-20 VITALS
TEMPERATURE: 98.8 F | HEIGHT: 59 IN | DIASTOLIC BLOOD PRESSURE: 69 MMHG | HEART RATE: 105 BPM | OXYGEN SATURATION: 96 % | WEIGHT: 183 LBS | RESPIRATION RATE: 20 BRPM | BODY MASS INDEX: 36.89 KG/M2 | SYSTOLIC BLOOD PRESSURE: 117 MMHG

## 2017-09-20 LAB
ANION GAP SERPL CALC-SCNC: 9 MMOL/L (ref 3–18)
BUN SERPL-MCNC: 23 MG/DL (ref 7–18)
BUN/CREAT SERPL: 23 (ref 12–20)
CALCIUM SERPL-MCNC: 8.7 MG/DL (ref 8.5–10.1)
CHLORIDE SERPL-SCNC: 107 MMOL/L (ref 100–108)
CO2 SERPL-SCNC: 27 MMOL/L (ref 21–32)
CREAT SERPL-MCNC: 1.02 MG/DL (ref 0.6–1.3)
GLUCOSE BLD STRIP.AUTO-MCNC: 77 MG/DL (ref 70–110)
GLUCOSE SERPL-MCNC: 83 MG/DL (ref 74–99)
HCG UR QL: NEGATIVE
POTASSIUM SERPL-SCNC: 4.2 MMOL/L (ref 3.5–5.5)
SODIUM SERPL-SCNC: 143 MMOL/L (ref 136–145)

## 2017-09-20 PROCEDURE — 74011000258 HC RX REV CODE- 258

## 2017-09-20 PROCEDURE — 74011250636 HC RX REV CODE- 250/636

## 2017-09-20 PROCEDURE — 77030011265 HC ELECTRD BLD HEX COVD -A: Performed by: STUDENT IN AN ORGANIZED HEALTH CARE EDUCATION/TRAINING PROGRAM

## 2017-09-20 PROCEDURE — 80048 BASIC METABOLIC PNL TOTAL CA: CPT | Performed by: NURSE ANESTHETIST, CERTIFIED REGISTERED

## 2017-09-20 PROCEDURE — 77030032490 HC SLV COMPR SCD KNE COVD -B: Performed by: STUDENT IN AN ORGANIZED HEALTH CARE EDUCATION/TRAINING PROGRAM

## 2017-09-20 PROCEDURE — 76060000034 HC ANESTHESIA 1.5 TO 2 HR: Performed by: STUDENT IN AN ORGANIZED HEALTH CARE EDUCATION/TRAINING PROGRAM

## 2017-09-20 PROCEDURE — 77030002888 HC SUT CHRMC J&J -A: Performed by: STUDENT IN AN ORGANIZED HEALTH CARE EDUCATION/TRAINING PROGRAM

## 2017-09-20 PROCEDURE — 77030011640 HC PAD GRND REM COVD -A: Performed by: STUDENT IN AN ORGANIZED HEALTH CARE EDUCATION/TRAINING PROGRAM

## 2017-09-20 PROCEDURE — 76210000017 HC OR PH I REC 1.5 TO 2 HR: Performed by: STUDENT IN AN ORGANIZED HEALTH CARE EDUCATION/TRAINING PROGRAM

## 2017-09-20 PROCEDURE — 74011250636 HC RX REV CODE- 250/636: Performed by: NURSE ANESTHETIST, CERTIFIED REGISTERED

## 2017-09-20 PROCEDURE — 76010000153 HC OR TIME 1.5 TO 2 HR: Performed by: STUDENT IN AN ORGANIZED HEALTH CARE EDUCATION/TRAINING PROGRAM

## 2017-09-20 PROCEDURE — 76210000021 HC REC RM PH II 0.5 TO 1 HR: Performed by: STUDENT IN AN ORGANIZED HEALTH CARE EDUCATION/TRAINING PROGRAM

## 2017-09-20 PROCEDURE — 82962 GLUCOSE BLOOD TEST: CPT

## 2017-09-20 PROCEDURE — 77030018836 HC SOL IRR NACL ICUM -A: Performed by: STUDENT IN AN ORGANIZED HEALTH CARE EDUCATION/TRAINING PROGRAM

## 2017-09-20 PROCEDURE — 81025 URINE PREGNANCY TEST: CPT

## 2017-09-20 PROCEDURE — 74011000250 HC RX REV CODE- 250

## 2017-09-20 PROCEDURE — 74011000250 HC RX REV CODE- 250: Performed by: STUDENT IN AN ORGANIZED HEALTH CARE EDUCATION/TRAINING PROGRAM

## 2017-09-20 PROCEDURE — 77030020782 HC GWN BAIR PAWS FLX 3M -B: Performed by: STUDENT IN AN ORGANIZED HEALTH CARE EDUCATION/TRAINING PROGRAM

## 2017-09-20 PROCEDURE — 82947 ASSAY GLUCOSE BLOOD QUANT: CPT

## 2017-09-20 PROCEDURE — 77030018846 HC SOL IRR STRL H20 ICUM -A: Performed by: STUDENT IN AN ORGANIZED HEALTH CARE EDUCATION/TRAINING PROGRAM

## 2017-09-20 PROCEDURE — 74011250637 HC RX REV CODE- 250/637: Performed by: NURSE ANESTHETIST, CERTIFIED REGISTERED

## 2017-09-20 RX ORDER — GLYCOPYRROLATE 0.2 MG/ML
INJECTION INTRAMUSCULAR; INTRAVENOUS AS NEEDED
Status: DISCONTINUED | OUTPATIENT
Start: 2017-09-20 | End: 2017-09-20 | Stop reason: HOSPADM

## 2017-09-20 RX ORDER — ONDANSETRON 2 MG/ML
4 INJECTION INTRAMUSCULAR; INTRAVENOUS ONCE
Status: COMPLETED | OUTPATIENT
Start: 2017-09-20 | End: 2017-09-20

## 2017-09-20 RX ORDER — SODIUM CHLORIDE, SODIUM LACTATE, POTASSIUM CHLORIDE, CALCIUM CHLORIDE 600; 310; 30; 20 MG/100ML; MG/100ML; MG/100ML; MG/100ML
INJECTION, SOLUTION INTRAVENOUS
Status: DISCONTINUED | OUTPATIENT
Start: 2017-09-20 | End: 2017-09-20 | Stop reason: HOSPADM

## 2017-09-20 RX ORDER — SUCCINYLCHOLINE CHLORIDE 20 MG/ML
INJECTION INTRAMUSCULAR; INTRAVENOUS AS NEEDED
Status: DISCONTINUED | OUTPATIENT
Start: 2017-09-20 | End: 2017-09-20 | Stop reason: HOSPADM

## 2017-09-20 RX ORDER — LIDOCAINE HYDROCHLORIDE 10 MG/ML
0.1 INJECTION, SOLUTION EPIDURAL; INFILTRATION; INTRACAUDAL; PERINEURAL AS NEEDED
Status: DISCONTINUED | OUTPATIENT
Start: 2017-09-20 | End: 2017-09-20 | Stop reason: HOSPADM

## 2017-09-20 RX ORDER — HYDROCORTISONE SODIUM SUCCINATE 100 MG/2ML
INJECTION, POWDER, FOR SOLUTION INTRAMUSCULAR; INTRAVENOUS AS NEEDED
Status: DISCONTINUED | OUTPATIENT
Start: 2017-09-20 | End: 2017-09-20 | Stop reason: HOSPADM

## 2017-09-20 RX ORDER — LABETALOL HYDROCHLORIDE 5 MG/ML
INJECTION, SOLUTION INTRAVENOUS AS NEEDED
Status: DISCONTINUED | OUTPATIENT
Start: 2017-09-20 | End: 2017-09-20 | Stop reason: HOSPADM

## 2017-09-20 RX ORDER — PROPOFOL 10 MG/ML
INJECTION, EMULSION INTRAVENOUS AS NEEDED
Status: DISCONTINUED | OUTPATIENT
Start: 2017-09-20 | End: 2017-09-20 | Stop reason: HOSPADM

## 2017-09-20 RX ORDER — SODIUM CHLORIDE 0.9 % (FLUSH) 0.9 %
5-10 SYRINGE (ML) INJECTION EVERY 8 HOURS
Status: DISCONTINUED | OUTPATIENT
Start: 2017-09-20 | End: 2017-09-20 | Stop reason: HOSPADM

## 2017-09-20 RX ORDER — ONDANSETRON 2 MG/ML
INJECTION INTRAMUSCULAR; INTRAVENOUS AS NEEDED
Status: DISCONTINUED | OUTPATIENT
Start: 2017-09-20 | End: 2017-09-20 | Stop reason: HOSPADM

## 2017-09-20 RX ORDER — SODIUM CHLORIDE, SODIUM LACTATE, POTASSIUM CHLORIDE, CALCIUM CHLORIDE 600; 310; 30; 20 MG/100ML; MG/100ML; MG/100ML; MG/100ML
75 INJECTION, SOLUTION INTRAVENOUS CONTINUOUS
Status: DISCONTINUED | OUTPATIENT
Start: 2017-09-20 | End: 2017-09-20 | Stop reason: HOSPADM

## 2017-09-20 RX ORDER — MIDAZOLAM HYDROCHLORIDE 1 MG/ML
INJECTION, SOLUTION INTRAMUSCULAR; INTRAVENOUS AS NEEDED
Status: DISCONTINUED | OUTPATIENT
Start: 2017-09-20 | End: 2017-09-20 | Stop reason: HOSPADM

## 2017-09-20 RX ORDER — FAMOTIDINE 20 MG/1
20 TABLET, FILM COATED ORAL ONCE
Status: COMPLETED | OUTPATIENT
Start: 2017-09-20 | End: 2017-09-20

## 2017-09-20 RX ORDER — ONDANSETRON 2 MG/ML
INJECTION INTRAMUSCULAR; INTRAVENOUS
Status: COMPLETED
Start: 2017-09-20 | End: 2017-09-20

## 2017-09-20 RX ORDER — NALOXONE HYDROCHLORIDE 0.4 MG/ML
0.1 INJECTION, SOLUTION INTRAMUSCULAR; INTRAVENOUS; SUBCUTANEOUS ONCE
Status: DISCONTINUED | OUTPATIENT
Start: 2017-09-20 | End: 2017-09-20 | Stop reason: HOSPADM

## 2017-09-20 RX ORDER — DEXAMETHASONE SODIUM PHOSPHATE 4 MG/ML
INJECTION, SOLUTION INTRA-ARTICULAR; INTRALESIONAL; INTRAMUSCULAR; INTRAVENOUS; SOFT TISSUE AS NEEDED
Status: DISCONTINUED | OUTPATIENT
Start: 2017-09-20 | End: 2017-09-20 | Stop reason: HOSPADM

## 2017-09-20 RX ORDER — ESMOLOL HYDROCHLORIDE 10 MG/ML
INJECTION INTRAVENOUS AS NEEDED
Status: DISCONTINUED | OUTPATIENT
Start: 2017-09-20 | End: 2017-09-20 | Stop reason: HOSPADM

## 2017-09-20 RX ORDER — INSULIN LISPRO 100 [IU]/ML
INJECTION, SOLUTION INTRAVENOUS; SUBCUTANEOUS ONCE
Status: DISCONTINUED | OUTPATIENT
Start: 2017-09-20 | End: 2017-09-20 | Stop reason: HOSPADM

## 2017-09-20 RX ORDER — SODIUM CHLORIDE 0.9 % (FLUSH) 0.9 %
5-10 SYRINGE (ML) INJECTION AS NEEDED
Status: DISCONTINUED | OUTPATIENT
Start: 2017-09-20 | End: 2017-09-20 | Stop reason: HOSPADM

## 2017-09-20 RX ORDER — PROMETHAZINE HYDROCHLORIDE 25 MG/ML
25 INJECTION, SOLUTION INTRAMUSCULAR; INTRAVENOUS
Status: DISCONTINUED | OUTPATIENT
Start: 2017-09-20 | End: 2017-09-20 | Stop reason: HOSPADM

## 2017-09-20 RX ORDER — HYDROMORPHONE HYDROCHLORIDE 2 MG/ML
0.5 INJECTION, SOLUTION INTRAMUSCULAR; INTRAVENOUS; SUBCUTANEOUS
Status: DISCONTINUED | OUTPATIENT
Start: 2017-09-20 | End: 2017-09-20 | Stop reason: HOSPADM

## 2017-09-20 RX ORDER — FENTANYL CITRATE 50 UG/ML
INJECTION, SOLUTION INTRAMUSCULAR; INTRAVENOUS AS NEEDED
Status: DISCONTINUED | OUTPATIENT
Start: 2017-09-20 | End: 2017-09-20 | Stop reason: HOSPADM

## 2017-09-20 RX ORDER — BUPIVACAINE HYDROCHLORIDE AND EPINEPHRINE 5; 5 MG/ML; UG/ML
INJECTION, SOLUTION EPIDURAL; INTRACAUDAL; PERINEURAL AS NEEDED
Status: DISCONTINUED | OUTPATIENT
Start: 2017-09-20 | End: 2017-09-20 | Stop reason: HOSPADM

## 2017-09-20 RX ORDER — LIDOCAINE HYDROCHLORIDE 20 MG/ML
INJECTION, SOLUTION EPIDURAL; INFILTRATION; INTRACAUDAL; PERINEURAL AS NEEDED
Status: DISCONTINUED | OUTPATIENT
Start: 2017-09-20 | End: 2017-09-20 | Stop reason: HOSPADM

## 2017-09-20 RX ADMIN — FENTANYL CITRATE 50 MCG: 50 INJECTION, SOLUTION INTRAMUSCULAR; INTRAVENOUS at 12:02

## 2017-09-20 RX ADMIN — LABETALOL HYDROCHLORIDE 5 MG: 5 INJECTION, SOLUTION INTRAVENOUS at 11:37

## 2017-09-20 RX ADMIN — SUCCINYLCHOLINE CHLORIDE 140 MG: 20 INJECTION INTRAMUSCULAR; INTRAVENOUS at 10:42

## 2017-09-20 RX ADMIN — FENTANYL CITRATE 50 MCG: 50 INJECTION, SOLUTION INTRAMUSCULAR; INTRAVENOUS at 10:42

## 2017-09-20 RX ADMIN — PROPOFOL 200 MG: 10 INJECTION, EMULSION INTRAVENOUS at 10:40

## 2017-09-20 RX ADMIN — HYDROCORTISONE SODIUM SUCCINATE 100 MG: 100 INJECTION, POWDER, FOR SOLUTION INTRAMUSCULAR; INTRAVENOUS at 10:58

## 2017-09-20 RX ADMIN — FENTANYL CITRATE 50 MCG: 50 INJECTION, SOLUTION INTRAMUSCULAR; INTRAVENOUS at 10:37

## 2017-09-20 RX ADMIN — HYDROMORPHONE HYDROCHLORIDE 0.5 MG: 2 INJECTION, SOLUTION INTRAMUSCULAR; INTRAVENOUS; SUBCUTANEOUS at 13:19

## 2017-09-20 RX ADMIN — HYDROMORPHONE HYDROCHLORIDE 0.5 MG: 2 INJECTION, SOLUTION INTRAMUSCULAR; INTRAVENOUS; SUBCUTANEOUS at 12:59

## 2017-09-20 RX ADMIN — DEXAMETHASONE SODIUM PHOSPHATE 8 MG: 4 INJECTION, SOLUTION INTRA-ARTICULAR; INTRALESIONAL; INTRAMUSCULAR; INTRAVENOUS; SOFT TISSUE at 10:52

## 2017-09-20 RX ADMIN — ESMOLOL HYDROCHLORIDE 30 MG: 10 INJECTION INTRAVENOUS at 11:09

## 2017-09-20 RX ADMIN — FAMOTIDINE 20 MG: 20 TABLET ORAL at 08:46

## 2017-09-20 RX ADMIN — ONDANSETRON 4 MG: 2 INJECTION INTRAMUSCULAR; INTRAVENOUS at 13:58

## 2017-09-20 RX ADMIN — HYDROMORPHONE HYDROCHLORIDE 0.5 MG: 2 INJECTION, SOLUTION INTRAMUSCULAR; INTRAVENOUS; SUBCUTANEOUS at 13:29

## 2017-09-20 RX ADMIN — HYDROMORPHONE HYDROCHLORIDE 0.5 MG: 2 INJECTION, SOLUTION INTRAMUSCULAR; INTRAVENOUS; SUBCUTANEOUS at 13:44

## 2017-09-20 RX ADMIN — GLYCOPYRROLATE 0.2 MG: 0.2 INJECTION INTRAMUSCULAR; INTRAVENOUS at 10:27

## 2017-09-20 RX ADMIN — ESMOLOL HYDROCHLORIDE 20 MG: 10 INJECTION INTRAVENOUS at 11:02

## 2017-09-20 RX ADMIN — FENTANYL CITRATE 50 MCG: 50 INJECTION, SOLUTION INTRAMUSCULAR; INTRAVENOUS at 11:32

## 2017-09-20 RX ADMIN — ESMOLOL HYDROCHLORIDE 30 MG: 10 INJECTION INTRAVENOUS at 11:18

## 2017-09-20 RX ADMIN — LIDOCAINE HYDROCHLORIDE 60 MG: 20 INJECTION, SOLUTION EPIDURAL; INFILTRATION; INTRACAUDAL; PERINEURAL at 10:37

## 2017-09-20 RX ADMIN — HYDROMORPHONE HYDROCHLORIDE 0.5 MG: 2 INJECTION, SOLUTION INTRAMUSCULAR; INTRAVENOUS; SUBCUTANEOUS at 13:09

## 2017-09-20 RX ADMIN — HYDROMORPHONE HYDROCHLORIDE 0.5 MG: 2 INJECTION, SOLUTION INTRAMUSCULAR; INTRAVENOUS; SUBCUTANEOUS at 13:54

## 2017-09-20 RX ADMIN — LABETALOL HYDROCHLORIDE 5 MG: 5 INJECTION, SOLUTION INTRAVENOUS at 11:41

## 2017-09-20 RX ADMIN — MIDAZOLAM HYDROCHLORIDE 2 MG: 1 INJECTION, SOLUTION INTRAMUSCULAR; INTRAVENOUS at 10:27

## 2017-09-20 RX ADMIN — ESMOLOL HYDROCHLORIDE 20 MG: 10 INJECTION INTRAVENOUS at 10:54

## 2017-09-20 RX ADMIN — SODIUM CHLORIDE, SODIUM LACTATE, POTASSIUM CHLORIDE, CALCIUM CHLORIDE: 600; 310; 30; 20 INJECTION, SOLUTION INTRAVENOUS at 10:33

## 2017-09-20 RX ADMIN — ONDANSETRON 4 MG: 2 INJECTION INTRAMUSCULAR; INTRAVENOUS at 11:18

## 2017-09-20 RX ADMIN — ONDANSETRON 4 MG: 2 SOLUTION INTRAMUSCULAR; INTRAVENOUS at 13:58

## 2017-09-20 RX ADMIN — PROPOFOL 100 MG: 10 INJECTION, EMULSION INTRAVENOUS at 10:47

## 2017-09-20 RX ADMIN — SODIUM CHLORIDE, SODIUM LACTATE, POTASSIUM CHLORIDE, AND CALCIUM CHLORIDE 75 ML/HR: 600; 310; 30; 20 INJECTION, SOLUTION INTRAVENOUS at 09:26

## 2017-09-20 NOTE — PROGRESS NOTES
31 yo AAF presents for extraction teeth #1,16,17,32. No changes in med hx since H and P. R/B/A thoroughly explained, questions answered, consent signed. Per pt lesion on lip has resolved.     Dr Ana Luisa Patterson

## 2017-09-20 NOTE — ANESTHESIA PREPROCEDURE EVALUATION
Anesthetic History   No history of anesthetic complications            Review of Systems / Medical History  Patient summary reviewed and pertinent labs reviewed    Pulmonary          Undiagnosed apnea         Neuro/Psych       CVA  TIA     Cardiovascular    Hypertension                Comments: Lupus   GI/Hepatic/Renal                Endo/Other        Morbid obesity and cancer     Other Findings   Comments:   Risk Factors for Postoperative nausea/vomiting:       History of postoperative nausea/vomiting? NO       Female? YES       Motion sickness? NO       Intended opioid administration for postoperative analgesia? YES      Smoking Abstinence  Current Smoker? NO  Elective Surgery? YES  Seen preoperatively by anesthesiologist or proxy prior to day of surgery? YES  Pt abstained from smoking 24 hours prior to anesthesia?  N/A           Physical Exam    Airway  Mallampati: III  TM Distance: 4 - 6 cm  Neck ROM: normal range of motion, short neck   Mouth opening: Diminished (comment)     Cardiovascular    Rhythm: regular  Rate: normal         Dental    Dentition: Poor dentition     Pulmonary  Breath sounds clear to auscultation               Abdominal  GI exam deferred       Other Findings            Anesthetic Plan    ASA: 3  Anesthesia type: general          Induction: Intravenous  Anesthetic plan and risks discussed with: Patient

## 2017-09-20 NOTE — BRIEF OP NOTE
BRIEF OPERATIVE NOTE    Date of Procedure: 9/20/2017   Preoperative Diagnosis: K13.70, K01.1, V12.54, I10, M32.10  Postoperative Diagnosis: K13.70, K01.1, V12.54, I10, M32.10    Procedure(s):  EXCISION OF LESION  REMOVAL OF IMPACTED WISDOM TEETH (#6,55,19,26)  Surgeon(s) and Role:     * Woodard Closs, DDS - Primary         Assistant Staff:       Surgical Staff:  Circ-1: Alexandre Chase RN  Scrub Tech-1: Wy[place]op[place] Mike  Surg Asst-1: Alexis Tidwell  Event Time In   Incision Start 1101   Incision Close 1200     Anesthesia: General   Estimated Blood Loss: 5cc  Specimens: * No specimens in log *   Findings: impacted third molars #9,84,29,50   Complications: patient had small lip abrasion right commissure area, used small amount of dermabond to adhere   Implants: * No implants in log *

## 2017-09-20 NOTE — PERIOP NOTES
I have reviewed discharge instructions with the patient, spouse and parents. The patient, spouse and parents verbalized understanding. Patient armband removed and shredded.

## 2017-09-20 NOTE — ANESTHESIA POSTPROCEDURE EVALUATION
Post-Anesthesia Evaluation and Assessment    Patient: Radha Wolf MRN: [id number]  SSN: [id number]    YOB: 1990  Age: 32 y.o. Sex: female     VS from flow sheet    Cardiovascular Function/Vital Signs  Visit Vitals    /76    Pulse (!) 105    Temp 37.1 °C (98.8 °F)    Resp 20    Ht 4' 11\" (1.499 m)    Wt 83 kg (183 lb)    SpO2 95%    BMI 36.96 kg/m2       Patient is status post general anesthesia for Procedure(s):  EXCISION OF LESION  REMOVAL OF IMPACTED TEETH TIMES FOUR. Nausea/Vomiting: None    Postoperative hydration reviewed and adequate. Pain:  Pain Scale 1: Visual (09/20/17 1219)  Pain Intensity 1: 0 (09/20/17 1219)   Managed    Neurological Status:   Neuro (WDL): Within Defined Limits (09/20/17 1219)   At baseline    Mental Status and Level of Consciousness: Arousable    Pulmonary Status:   O2 Device: Nasal cannula (09/20/17 1224)   Adequate oxygenation and airway patent    Complications related to anesthesia: None    Post-anesthesia assessment completed.  No concerns    Signed By: Carina Rasheed MD     September 20, 2017

## 2017-09-20 NOTE — DISCHARGE INSTRUCTIONS
Resume Coumadin tomorrow. [place] Tooth Extraction: What to Expect at Home  Your Recovery  After your procedure, you may have some pain and swelling. You should feel better in a few days. Your doctor can give you medicine for pain. Your doctor will remove the stitches after a few days, if needed. This care sheet gives you a general idea about how long it will take for you to recover. But each person recovers at a different pace. Follow the steps below to feel better as quickly as possible. How can you care for yourself at home? Activity  · Relax after surgery. Physical activity may increase bleeding. · Do not lie flat. This may prolong bleeding. Prop up your head with pillows. Diet  · Eat soft foods, such as gelatin, pudding, or a thin soup. Gradually add solid foods to your diet as you heal.  · Do not use a straw for the first few days. Sucking on a straw can loosen the blood clot that forms at the surgery site. If this happens, it can delay healing. Medicines  · Your doctor will tell you if and when you can restart your medicines. He or she will also give you instructions about taking any new medicines. · If you take blood thinners, such as warfarin (Coumadin), clopidogrel (Plavix), or aspirin, be sure to talk to your doctor. He or she will tell you if and when to start taking those medicines again. Make sure that you understand exactly what your doctor wants you to do. · If your doctor prescribed antibiotics, take them as directed. Do not stop taking them just because you feel better. You need to take the full course of antibiotics. · Take pain medicines exactly as directed. ¨ If the doctor gave you a prescription medicine for pain, take it as prescribed. ¨ If you are not taking a prescription pain medicine, ask your doctor if you can take an over-the-counter medicine.   · If you think your pain medicine is making you sick to your stomach:  ¨ Take your medicine after meals (unless your doctor has told you not to). ¨ Ask your doctor for a different pain medicine. Incision care  · Bite gently on the gauze pad periodically, and change pads as they become soaked with blood. Call your dentist or oral surgeon if you still have bleeding 24 hours after your surgery. · While your mouth is numb, be careful not to bite the inside of your cheek or lip, or your tongue. · After 24 hours, gently rinse your mouth with warm salt water several times a day to reduce swelling and relieve pain. Do not rinse hard. This can loosen the blood clot and delay healing. · Avoid rubbing the area with your tongue or touching it with your fingers. · Continue to brush your teeth and tongue carefully. Ice and heat  · Try using an ice pack on the outside of your cheek for the first 24 hours. You can use moist heat--such as a washcloth soaked in warm water and wrung out--for the following 2 or 3 days. Other instructions  · Do not smoke for at least 24 hours after your surgery. The sucking motion can loosen the clot and delay healing. In addition, smoking decreases the blood supply and can bring germs and contaminants to the surgery area. Follow-up care is a key part of your treatment and safety. Be sure to make and go to all appointments, and call your doctor if you are having problems. It's also a good idea to know your test results and keep a list of the medicines you take. When should you call for help? Call 911 anytime you think you may need emergency care. For example, call if:  · You passed out (lost consciousness). · You have severe trouble breathing. Call your doctor now or seek immediate medical care if:  · You have pain that does not get better after you take pain medicine. · You have loose stitches, or your incision comes open. · You have new or more bleeding from the site. · You have signs of infection, such as:  ¨ Increased pain, swelling, warmth, or redness. ¨ Red streaks leading from the area.   ¨ Pus draining from the area. ¨ A fever. Watch closely for any changes in your health, and be sure to contact your doctor if you have any problems. Where can you learn more? Go to http://apryl-jessica.info/. Enter M214 in the search box to learn more about \"[place] Tooth Extraction: What to Expect at Home. \"  Current as of: August 9, 2016  Content Version: 11.3  © 3970-6139 [employer]. Care instructions adapted under license by [employer] (which disclaims liability or warranty for this information). If you have questions about a medical condition or this instruction, always ask your healthcare professional. [place] [address] any warranty or liability for your use of this information. DISCHARGE SUMMARY from Nurse    The following personal items are in your possession at time of discharge:    Dental Appliances: None  Visual Aid: Glasses     Home Medications: None  Jewelry: None  Clothing: Shirt, Pants, Undergarments, Footwear  Other Valuables: Eyeglasses     PATIENT INSTRUCTIONS:    After general anesthesia or intravenous sedation, for 24 hours or while taking prescription Narcotics:  · Limit your activities  · Do not drive and operate hazardous machinery  · Do not make important personal or business decisions  · Do  not drink alcoholic beverages  · If you have not urinated within 8 hours after discharge, please contact your surgeon on call. Report the following to your surgeon:  · Excessive pain, swelling, redness or odor of or around the surgical area  · Temperature over 100.5  · Nausea and vomiting lasting longer than 4 hours or if unable to take medications  · Any signs of decreased circulation or nerve impairment to extremity: change in color, persistent  numbness, tingling, coldness or increase pain  · Any questions    *  Please give a list of your current medications to your Primary Care Provider.     *  Please update this list whenever your medications are discontinued, doses are      changed, or new medications (including over-the-counter products) are added. *  Please carry medication information at all times in case of emergency situations. These are general instructions for a healthy lifestyle:    No smoking/ No tobacco products/ Avoid exposure to second hand smoke    Surgeon General's Warning:  Quitting smoking now greatly reduces serious risk to your health. Obesity, smoking, and sedentary lifestyle greatly increases your risk for illness    A healthy diet, regular physical exercise & weight monitoring are important for maintaining a healthy lifestyle    You may be retaining fluid if you have a history of heart failure or if you experience any of the following symptoms:  Weight gain of 3 pounds or more overnight or 5 pounds in a week, increased swelling in our hands or feet or shortness of breath while lying flat in bed. Please call your doctor as soon as you notice any of these symptoms; do not wait until your next office visit. Recognize signs and symptoms of STROKE:    F-face looks uneven    A-arms unable to move or move unevenly    S-speech slurred or non-existent    T-time-call 911 as soon as signs and symptoms begin-DO NOT go       Back to bed or wait to see if you get better-TIME IS BRAIN. Warning Signs of HEART ATTACK     Call 911 if you have these symptoms:   Chest discomfort. Most heart attacks involve discomfort in the center of the chest that lasts more than a few minutes, or that goes away and comes back. It can feel like uncomfortable pressure, squeezing, fullness, or pain.  Discomfort in other areas of the upper body. Symptoms can include pain or discomfort in one or both arms, the back, neck, jaw, or stomach.  Shortness of breath with or without chest discomfort.  Other signs may include breaking out in a cold sweat, nausea, or lightheadedness. Don't wait more than five minutes to call 911 - MINUTES MATTER!  Fast action can save your life. Calling 911 is almost always the fastest way to get lifesaving treatment. Emergency Medical Services staff can begin treatment when they arrive -- up to an hour sooner than if someone gets to the hospital by car. The discharge information has been reviewed with the patient and spouse. The patient and spouse verbalized understanding. Discharge medications reviewed with the patient and spouse and appropriate educational materials and side effects teaching were provided.

## 2017-09-20 NOTE — IP AVS SNAPSHOT
Rufus Harris 
 
 
 [address] [facility] [address] 
[phone] Patient: Arcadio Mosher MRN: [id number] :1990 You are allergic to the following Allergen Reactions Flagyl (Metronidazole) Nausea Only Keflex (Cephalexin) Rash Macrobid (Nitrofurantoin Monohyd/M-Cryst) Nausea Only Vancomycin Nausea Only Recent Documentation Height Weight BMI OB Status Smoking Status 1.499 m 83 kg 36.96 kg/m2 Chemically Induced Never Smoker Emergency Contacts Name Discharge Info Relation Home Work Mobile [address] [facility] CAREGIVER [3] Spouse [3] [phone] About your hospitalization You were admitted on:  2017 You last received care in the:  [address] [facility] PACU You were discharged on:  2017 Unit phone number:  [phone] Why you were hospitalized Your primary diagnosis was:  Not on File Providers Seen During Your Hospitalizations Provider Role Specialty Primary office phone Carmelina Schaumann, DDS Attending Provider Oral Surgery [phone] Your Primary Care Physician (PCP) Primary Care Physician Office Phone Office Fax Umang Ramirez [phone] Follow-up Information Follow up With Details Comments Contact Info Betsy Cain MD   [place] [address] [address] [place] [address] 
[phone] Carmelina Schaumann, DDS Schedule an appointment as soon as possible for a visit in 1 week(s) Please call [phone] to schedule a follow up appointment in one week, sooner if needed. [address] [facility] [address] [facility] [address] [address] 
[phone] Current Discharge Medication List  
  
ASK your doctor about these medications Dose & Instructions Dispensing Information Comments Morning Noon Evening Bedtime  
 enoxaparin 40 mg/0.4 mL Commonly known as:  LOVENOX Your last dose was: Your next dose is:    
   
   
 Dose:  40 mg  
40 mg by SubCUTAneous route daily. Refills:  0  
     
   
   
   
  
 mycophenolate 500 mg tablet Commonly known as:  CELLCEPT Your last dose was: Your next dose is:    
   
   
 Dose:  500 mg Take 500 mg by mouth two (2) times a day. Refills:  0  
     
   
   
   
  
 oxyCODONE-acetaminophen 5-325 mg per tablet Commonly known as:  PERCOCET Your last dose was: Your next dose is:    
   
   
 Dose:  1 Tab Take 1 Tab by mouth every four (4) hours as needed for Pain. Max Daily Amount: 6 Tabs. Quantity:  30 Tab Refills:  0  
     
   
   
   
  
 predniSONE 10 mg tablet Commonly known as:  Dalbert Nunez Your last dose was: Your next dose is:    
   
   
 Dose:  9 mg Take 9 mg by mouth daily (with breakfast). Refills:  0  
     
   
   
   
  
 simvastatin 20 mg tablet Commonly known as:  ZOCOR Your last dose was: Your next dose is: Take  by mouth nightly. Refills:  0  
     
   
   
   
  
 valsartan 160 mg tablet Commonly known as:  DIOVAN Your last dose was: Your next dose is: Take  by mouth daily. Refills:  0  
     
   
   
   
  
 warfarin 6 mg tablet Commonly known as:  COUMADIN Your last dose was: Your next dose is:    
   
   
 Dose:  7.5 mg Take 7.5 mg by mouth daily. Refills:  0 Discharge Instructions Resume Coumadin tomorrow. [place] Tooth Extraction: What to Expect at [facility] Your Recovery After your procedure, you may have some pain and swelling. You should feel better in a few days. Your doctor can give you medicine for pain. Your doctor will remove the stitches after a few days, if needed. This care sheet gives you a general idea about how long it will take for you to recover. But each person recovers at a different pace.  Follow the steps below to feel better as quickly as possible. How can you care for yourself at home? Activity · Relax after surgery. Physical activity may increase bleeding. · Do not lie flat. This may prolong bleeding. Prop up your head with pillows. Diet · Eat soft foods, such as gelatin, pudding, or a thin soup. Gradually add solid foods to your diet as you heal. 
· Do not use a straw for the first few days. Sucking on a straw can loosen the blood clot that forms at the surgery site. If this happens, it can delay healing. Medicines · Your doctor will tell you if and when you can restart your medicines. He or she will also give you instructions about taking any new medicines. · If you take blood thinners, such as warfarin (Coumadin), clopidogrel (Plavix), or aspirin, be sure to talk to your doctor. He or she will tell you if and when to start taking those medicines again. Make sure that you understand exactly what your doctor wants you to do. · If your doctor prescribed antibiotics, take them as directed. Do not stop taking them just because you feel better. You need to take the full course of antibiotics. · Take pain medicines exactly as directed. ¨ If the doctor gave you a prescription medicine for pain, take it as prescribed. ¨ If you are not taking a prescription pain medicine, ask your doctor if you can take an over-the-counter medicine. · If you think your pain medicine is making you sick to your stomach: 
¨ Take your medicine after meals (unless your doctor has told you not to). ¨ Ask your doctor for a different pain medicine. Incision care · Bite gently on the gauze pad periodically, and change pads as they become soaked with blood. Call your dentist or oral surgeon if you still have bleeding 24 hours after your surgery. · While your mouth is numb, be careful not to bite the inside of your cheek or lip, or your tongue.  
· After 24 hours, gently rinse your mouth with warm salt water several times a day to reduce swelling and relieve pain. Do not rinse hard. This can loosen the blood clot and delay healing. · Avoid rubbing the area with your tongue or touching it with your fingers. · Continue to brush your teeth and tongue carefully. Ice and heat · Try using an ice pack on the outside of your cheek for the first 24 hours. You can use moist heatsuch as a washcloth soaked in warm water and wrung outfor the following 2 or 3 days. Other instructions · Do not smoke for at least 24 hours after your surgery. The sucking motion can loosen the clot and delay healing. In addition, smoking decreases the blood supply and can bring germs and contaminants to the surgery area. Follow-up care is a key part of your treatment and safety. Be sure to make and go to all appointments, and call your doctor if you are having problems. It's also a good idea to know your test results and keep a list of the medicines you take. When should you call for help? Call 911 anytime you think you may need emergency care. For example, call if: 
· You passed out (lost consciousness). · You have severe trouble breathing. Call your doctor now or seek immediate medical care if: 
· You have pain that does not get better after you take pain medicine. · You have loose stitches, or your incision comes open. · You have new or more bleeding from the site. · You have signs of infection, such as: 
¨ Increased pain, swelling, warmth, or redness. ¨ Red streaks leading from the area. ¨ Pus draining from the area. ¨ A fever. Watch closely for any changes in your health, and be sure to contact your doctor if you have any problems. Where can you learn more? Go to http://apryl-jessica.info/. Enter M214 in the search box to learn more about \"[place] Tooth Extraction: What to Expect at Home. \" Current as of: August 9, 2016 Content Version: 11.3 © 1016-4294 [employer].  Care instructions adapted under license by [address] (which disclaims liability or warranty for this information). If you have questions about a medical condition or this instruction, always ask your healthcare professional. [address] any warranty or liability for your use of this information. DISCHARGE SUMMARY from Nurse The following personal items are in your possession at time of discharge: 
 
Dental Appliances: None Visual Aid: Glasses Home Medications: None Jewelry: None Clothing: Shirt, Pants, Undergarments, Footwear Other Valuables: Orlean Claude PATIENT INSTRUCTIONS: 
 
 
F-face looks uneven A-arms unable to move or move unevenly S-speech slurred or non-existent T-time-call 911 as soon as signs and symptoms begin-DO NOT go Back to bed or wait to see if you get better-TIME IS BRAIN. Warning Signs of HEART ATTACK Call 911 if you have these symptoms: 
? Chest discomfort. Most heart attacks involve discomfort in the center of the chest that lasts more than a few minutes, or that goes away and comes back. It can feel like uncomfortable pressure, squeezing, fullness, or pain. ? Discomfort in other areas of the upper body. Symptoms can include pain or discomfort in one or both arms, the back, neck, jaw, or stomach. ? Shortness of breath with or without chest discomfort. ? Other signs may include breaking out in a cold sweat, nausea, or lightheadedness. Don't wait more than five minutes to call [address]! Fast action can save your life. Calling 911 is almost always the fastest way to get lifesaving treatment. Emergency Medical Services staff can begin treatment when they arrive  up to an hour sooner than if someone gets to the hospital by car. The discharge information has been reviewed with the patient and spouse. The patient and spouse verbalized understanding. Discharge medications reviewed with the patient and spouse and appropriate educational materials and side effects teaching were provided. Discharge Orders None Introducing hospitals & HEALTH SERVICES! Alon Bae introduces [employer] patient portal. Now you can access parts of your medical record, email your doctor's office, and request medication refills online. 1. In your internet browser, go to [email or web address] 2. Click on the First Time User? Click Here link in the Sign In box. You will see the New Member Sign Up page. 3. Enter your [employer] Access Code exactly as it appears below. You will not need to use this code after youve completed the sign-up process. If you do not sign up before the expiration date, you must request a new code. · [employer] Access Code: [email or web address] Expires: 10/14/2017  9:02 AM 
 
4. Enter the last four digits of your Social Security Number (xxxx) and Date of Birth (mm/dd/yyyy) as indicated and click Submit. You will be taken to the next sign-up page. 5. Create a [employer] ID. This will be your [employer] login ID and cannot be changed, so think of one that is secure and easy to remember. 6. Create a [employer] password. You can change your password at any time. 7. Enter your Password Reset Question and Answer. This can be used at a later time if you forget your password. 8. Enter your e-mail address. You will receive e-mail notification when new information is available in [address] 9. Click Sign Up. You can now view and download portions of your medical record. 10. Click the Download Summary menu link to download a portable copy of your medical information. If you have questions, please visit the Frequently Asked Questions section of the [employer] website. Remember, [employer] is NOT to be used for urgent needs. For medical emergencies, dial 911. Now available from your iPhone and Android! General Information Please provide this summary of care documentation to your next provider. Patient Signature:  ____________________________________________________________ Date:  ____________________________________________________________  
  
Larri Hazard Provider Signature:  ____________________________________________________________ Date:  ____________________________________________________________

## 2017-09-21 LAB
BUN BLD-MCNC: 30 MG/DL (ref 7–18)
CHLORIDE BLD-SCNC: 109 MMOL/L (ref 100–108)
GLUCOSE BLD STRIP.AUTO-MCNC: 88 MG/DL (ref 74–106)
HCT VFR BLD CALC: 39 % (ref 36–49)
HGB BLD-MCNC: 13.3 G/DL (ref 12–16)
POTASSIUM BLD-SCNC: 5.6 MMOL/L (ref 3.5–5.5)
SODIUM BLD-SCNC: 142 MMOL/L (ref 136–145)

## 2018-03-28 ENCOUNTER — HOSPITAL ENCOUNTER (OUTPATIENT)
Dept: MRI IMAGING | Age: 28
Discharge: HOME | End: 2018-03-28
Attending: ORTHOPAEDIC SURGERY
Payer: COMMERCIAL

## 2018-03-28 DIAGNOSIS — M54.50 LUMBAGO: ICD-10-CM

## 2018-03-28 PROCEDURE — 72148 MRI LUMBAR SPINE W/O DYE: CPT

## 2018-04-09 ENCOUNTER — HOSPITAL ENCOUNTER (OUTPATIENT)
Dept: LAB | Age: 28
Discharge: HOME | End: 2018-04-09

## 2018-04-09 LAB — SENTARA SPECIMEN COL,SENBCF: NORMAL

## 2018-04-09 PROCEDURE — 99001 SPECIMEN HANDLING PT-LAB: CPT | Performed by: INTERNAL MEDICINE

## 2019-09-09 ENCOUNTER — HOSPITAL ENCOUNTER (OUTPATIENT)
Dept: NUTRITION | Age: 29
Discharge: HOME | End: 2019-09-09
Payer: COMMERCIAL

## 2019-09-09 PROCEDURE — 97802 MEDICAL NUTRITION INDIV IN: CPT

## 2019-09-09 NOTE — PROGRESS NOTES
Amelai Looney [facility] - Outpatient Nutrition Services  [id number] [place] [address] See Boateng   [phone]   Nutrition Assessment - Medical Nutrition Therapy   Outpatient Initial Evaluation         Patient Name: Jeanette Rouse : 1990   Treatment Diagnosis: Obesity   Referral Source: Agnieszka Leiva MD Start of Care [facility]): 2019     Gender: female Age: 30 y.o. Ht: 59 in Wt:  179.7 lb  kg   BMI: 36.3 BMR   Male  BMR Female 1441   Anthropometrics Assessment: BMI indicates obese II     Past Medical History includes: Lupus (since 13 yo), s/p stroke 5 d/t Lupus, cervical CA 4 years ago     Pertinent Medications:   Losartan, Prednisone, Xarelto, D2 50,000 1 x week, Cellcept (Lupus)   Biochemical Data:        Subjective/Assessment:   Pt in today with desire to lose weight. She is frustrated that she feels she has tried everything to lose weight without success. However, she did try keto for one month and lost 10 lbs. Since that time she has stopped because she felt strange. In April she had a tummy tuck. 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 She doesn't currently exercise and she lives home with her 1.6 yo daughter. Pt under a lot of stress at home. Current Eating Patterns: Pt does not have a consistent eating pattern. She will skip breakfast often, but this morning she had a piece of toast. Typically, pt has lunch at work with her students. L: ham or turkey sandwich or salad or leftovers. D: baked chicken with salad. She doesn't snack often, but at times, she has Pringles by her bed. Pt does not have dessert often. She will drink juice and water. She feels she is getting enough water t/o the day. Pt will have an alcohol drink socially/occasionally.      Estimate Needs   Calories: 1450  Protein: 109 Carbs: 145 Fat: 48   Kcal/day  g/day  g/day  g/day        percent: 30  40  30               Education & Recommendations provided: Discussed the Healthy Plate Method and appropriate portion sizes of different food groups. Explained the importance of combining carbohydrates with protein at meals and reviewed foods that contain each nutrient. Emphasized the importance of consistent meal time intervals throughout the day to improve metabolism. Explained calorie density and empty calories to assist pt with understanding portion sizes and limiting excess calories. Educated pt on all carbohydrates found in foods and encouraged no more than 45 g/meal and 20 g/snack. Encouraged pt not to go longer than 5 hours without eating but to space meals/snacks at least 3 hours apart. Handouts Provided: [x]  Carbohydrates  [x]  Protein  []  Fiber  []  Serving Sizes  []  Meal and Snack Ideas  []  Food Journals []  Diabetes  []  Cholesterol  []  Sodium  []  Gen Nutr Guidelines  []  SBGM Guidelines  [x]  Others: Health tips for eating out, snacks, non-starchy veggies, right size portion plate   Information Reviewed with: Pt   Readiness to Change Stage: []  Pre-contemplative    []  Contemplative  []  Preparation               [x]  Action                  []  Maintenance   Potential Barriers to Learning: []  Decline in memory    []  Language barrier   []  Other:  []  Emotional                  []  Limited mobility  []  Lack of motivation     [] Vision, hearing or cognitive impairment   Expected Compliance: Good     Nutritional Goal - To promote lifestyle changes to result in:    [x]  Weight loss  []  Improved diabetic control  []  Decreased cholesterol levels  []  Decreased blood pressure  []  Weight maintenance []  Preventing any interactions associated with food allergies  []  Adequate weight gain toward goal weight  []  Other:        Patient Goals:  SMART goals 1. Limit CHO's to 45 g/meal and 20 g/snack  2. Have at least 1 serving of protein each time you eat (balanced meals)  3. Aim to have a meal or snack within 1-2 hrs of waking - repeat every 3-5 hrs - stop 2 hrs before bed  4.  Aim for 2 meals and 2 snacks/day Dietitian Signature: Elba Arredondo MS, RD Date: 9/9/2019   Follow-up: 9/23/19 @ 4 Time: 4:36 PM

## 2019-09-23 ENCOUNTER — HOSPITAL ENCOUNTER (OUTPATIENT)
Dept: NUTRITION | Age: 29
Discharge: HOME | End: 2019-09-23
Payer: COMMERCIAL

## 2019-09-23 PROCEDURE — 97803 MED NUTRITION INDIV SUBSEQ: CPT

## 2019-10-07 ENCOUNTER — APPOINTMENT (OUTPATIENT)
Dept: NUTRITION | Age: 29
End: 2019-10-07
Payer: COMMERCIAL

## 2019-10-30 ENCOUNTER — HOSPITAL ENCOUNTER (OUTPATIENT)
Dept: NUTRITION | Age: 29
Discharge: HOME | End: 2019-10-30
Payer: COMMERCIAL

## 2019-10-30 PROCEDURE — 97803 MED NUTRITION INDIV SUBSEQ: CPT

## 2019-10-30 NOTE — PROGRESS NOTES
NUTRITION - FOLLOW-UP TREATMENT NOTE  Patient Name: Catrina Avery         Date: 10/30/2019  : 1990    YES/NO Patient  Verified  Diagnosis: Obesity   In time:   5             Out time:   5:30   Total Treatment Time (min):   30     SUBJECTIVE/ASSESSMENT    Changes in medication or medical history? Any new allergies, surgeries or procedures? YES/NO    If yes, update Summary List   Pt in today for follow up. She states that she is not doing well with her goals and her living situation has been stressful. Pt feels she may have an eating disorder and and plans to seek counseling from psychologist. She states that she thought she was stress eating, but feels she is not in control of her food intake and also overeats and eats unhealthy foods that she does not want. Resources provided. Current Wt: 186.1 Previous Wt: 182.9 Wt Change: +3.2     Achievement of Goals: 1. Have snack or protein shake rather than skipping = in progress, continue  2. Try protein oatmeal = not met, d/c  3. Can have 1 sandwich/day but try adding salad or other veggies = not met, d/c     Patient Education:  [x]  Review current plan with patient   [x]  Other: Behavioral health professionals   Handouts/  Information Provided: []  Carbohydrates  []  Protein  []  Fiber  []  Serving Sizes  []  Fluids  []  General guidelines []  Diabetes  []  Cholesterol  []  Sodium  []  SBGM  []  Food Journals  []  Others:      New Patient Goals: 1. Work on using food record to [employer] food/mood/hunger to find triggers  2.  Look for therapist/counselor until you can get appointment with psychiatrist (not until 2020)     PLAN    [x]  Continue on current plan []  Follow-up PRN   []  Discharge due to :    [x]  Next appt: 19 @ 2     Dietitian: Citlaly Dumont MS, RD    Date: 10/30/2019 Time: 4:51 PM

## 2019-11-11 ENCOUNTER — HOSPITAL ENCOUNTER (OUTPATIENT)
Dept: NUTRITION | Age: 29
Discharge: HOME | End: 2019-11-11
Payer: COMMERCIAL

## 2019-11-11 PROCEDURE — 97803 MED NUTRITION INDIV SUBSEQ: CPT

## 2019-11-11 NOTE — PROGRESS NOTES
NUTRITION - FOLLOW-UP TREATMENT NOTE  Patient Name: Thelma Mathias         Date: 2019  : 1990    YES/NO Patient  Verified  Diagnosis: Obesity   In time:   2             Out time:   2:30   Total Treatment Time (min):   30     SUBJECTIVE/ASSESSMENT    Changes in medication or medical history? Any new allergies, surgeries or procedures? NO    If yes, update Summary List   Pt in for follow up. She has been following the brightline eating plan - 3 meals/day, no snacks, grains in am only, fruit only at breakfast and lunch. Pt has had a favorable 7. 6# wt loss since her last visit and states that this way of eating works best for her. B: 1 pk grits with 2 oz cheese and apple with 2 cups water. L: 8 oz salad with cucumber with 2 oz cheese and banana. D: grilled chicken with string beans and carrots. Current Wt: 178.5 Previous Wt: 186.1 Wt Change: -7.6     Achievement of Goals: 1. Work on using food record to [employer] food/mood/hunger to find triggers = met, d/c  2. Look for therapist/counselor until you can get appointment with psychiatrist (not until 2020) = met, d/c     Patient Education:  [x]  Review current plan with patient   []  Other:    Handouts/  Information Provided: []  Carbohydrates  []  Protein  []  Fiber  []  Serving Sizes  []  Fluids  []  General guidelines []  Diabetes  []  Cholesterol  []  Sodium  []  SBGM  []  Food Journals  []  Others:      New Patient Goals: 1. Aim to walk or use elliptical 30+ min/week  2.  Be mindful of kcals/food/drink @ wedding buffet and brunch     PLAN    [x]  Continue on current plan []  Follow-up PRN   []  Discharge due to :    [x]  Next appt: 19 @ 4     Dietitian: Williams Owen MS, RD    Date: 2019 Time: 2:06 PM

## 2019-11-19 ENCOUNTER — APPOINTMENT (OUTPATIENT)
Dept: NUTRITION | Age: 29
End: 2019-11-19
Payer: COMMERCIAL

## 2020-10-15 ENCOUNTER — DOCUMENTATION ONLY (OUTPATIENT)
Dept: BARIATRICS/WEIGHT MGMT | Age: 30
End: 2020-10-15

## 2020-10-15 NOTE — PROGRESS NOTES
Patient attended a Medically Supervised Weight Loss New Patient Orientation today where we discussed:  - New Direction Very Low Calorie Diet details  - Medical Supervision  - Nutrition education  - Cost of Meal Replacements  - Policies and compliance required for program enrollment.      Patients initial consultation with provider is tentatively scheduled for:  Future Appointments   Date Time Provider Lluvia Soriano   10/20/2020  3:00 PM Denzil Kussmaul, NP [facility]

## 2020-10-20 ENCOUNTER — OFFICE VISIT (OUTPATIENT)
Dept: SURGERY | Age: 30
End: 2020-10-20
Payer: COMMERCIAL

## 2020-10-20 VITALS
HEIGHT: 59 IN | WEIGHT: 196 LBS | TEMPERATURE: 96.8 F | DIASTOLIC BLOOD PRESSURE: 89 MMHG | SYSTOLIC BLOOD PRESSURE: 135 MMHG | HEART RATE: 76 BPM | RESPIRATION RATE: 20 BRPM | BODY MASS INDEX: 39.51 KG/M2

## 2020-10-20 DIAGNOSIS — Z71.3 WEIGHT LOSS COUNSELING, ENCOUNTER FOR: ICD-10-CM

## 2020-10-20 DIAGNOSIS — Z72.4 INAPPROPRIATE DIET AND EATING HABITS: ICD-10-CM

## 2020-10-20 DIAGNOSIS — Z71.3 DIETARY COUNSELING AND SURVEILLANCE: ICD-10-CM

## 2020-10-20 DIAGNOSIS — I10 ESSENTIAL HYPERTENSION: Primary | ICD-10-CM

## 2020-10-20 DIAGNOSIS — E66.01 SEVERE OBESITY (BMI 35.0-39.9) WITH COMORBIDITY (HCC): ICD-10-CM

## 2020-10-20 DIAGNOSIS — I10 ESSENTIAL HYPERTENSION: ICD-10-CM

## 2020-10-20 PROCEDURE — 99203 OFFICE O/P NEW LOW 30 MIN: CPT | Performed by: NURSE PRACTITIONER

## 2020-10-20 RX ORDER — LOSARTAN POTASSIUM 25 MG/1
TABLET ORAL
COMMUNITY
Start: 2020-08-07

## 2020-10-20 RX ORDER — RIVAROXABAN 20 MG/1
TABLET, FILM COATED ORAL
COMMUNITY
Start: 2020-07-27

## 2020-10-20 NOTE — PROGRESS NOTES
Initial Consultation for Weight Loss    Ivania Lorenzo is a 26 y.o. female who comes into the office today for initial consultation for the options for the treatment of obesity. The patient initially identified obesity at the age of 25 and at age 23 weighed 120lbs. She has tried a variety of unsupervised weight-loss attempts including self imposed and keto, liquid fasting, abdominoplasty, but has yet to meet with lasting success. Maximum weight lost on a diet is about 5 lbs, but that the weight loss always seems to return. Today, the patient is  Height: 4' 11\" (149.9 cm) tall, Weight: 88.9 kg (196 lb) lbs for a Body mass index is 39.59 kg/m². It is due to the patient's obesity, which is further complicated by hypertension, cancer of cervix and stroke  that the patient is now seeking out medically supervised VLCD. Ideal body weight: 48.8 kg (107 lb 8.4 oz)  Adjusted ideal body weight: 64.8 kg (142 lb 14.6 oz) (Based on [employer] Tables)  Goal weight: 150 lbs     Wt Readings from Last 10 Encounters:   10/20/20 88.9 kg (196 lb)   09/20/17 83 kg (183 lb)   04/04/17 88.5 kg (195 lb)   06/08/16 84.2 kg (185 lb 9.6 oz)   05/23/16 85.4 kg (188 lb 3 oz)   05/04/16 85.3 kg (188 lb)   04/13/16 86.6 kg (191 lb)   12/30/15 84.9 kg (187 lb 3.2 oz)   12/16/15 82.6 kg (182 lb)   12/08/15 83.9 kg (185 lb)       Weight Metrics 10/20/2020 10/20/2020 9/20/2017 4/4/2017 6/8/2016 5/23/2016 5/17/2016   Weight - 196 lb 183 lb 195 lb 185 lb 9.6 oz 188 lb 3 oz -   Waist Measure Inches 36 - - - - - -   Body Fat % 42.5 - - - - - -   BMI - 39.59 kg/m2 36.96 kg/m2 39.39 kg/m2 37.47 kg/m2 37.99 kg/m2 36.34 kg/m2           History of binge eating: no    History of purging: no    Major lifestyle changes: yes    Other commitments: no   Any potential unsupportive: no     Has Ramila Schwabzenia ever been told by a physician not to exercise: no    Does Ramila Rivera know of any reason they shouldn't exercise: no  If yes, why?       Does Juancarlos Kennedy have any food allergies or sensitivities: no      MWL questionnaire reviewed. If female:  No LMP recorded. (Menstrual status: Chemically Induced). Past Medical History:   Diagnosis Date    Cancer ([facility] [address] JAN 2015    CERVICAL--HAD CHEMO & RADIATION    Hypertension     Lupus ([facility] [address]     Nephritis     Stroke ([facility]     nov.-2013       Past Surgical History:   Procedure Laterality Date    HX GYN      Brachy therapy    HX OTHER SURGICAL      fatty tissue from abdomen removed    MO EXC TUMOR SOFT TISSUE ABDOMINAL WALL SUBQ 3+CM  12/16/2015    Dr. Jose Boyd  Left and Right side abdomen       Current Outpatient Medications   Medication Sig Dispense Refill    losartan (COZAAR) 25 mg tablet       Xarelto 20 mg tab tablet       predniSONE (DELTASONE) 10 mg tablet Take 8 mg by mouth daily (with breakfast).  mycophenolate (CELLCEPT) 500 mg tablet Take 500 mg by mouth two (2) times a day. Allergies   Allergen Reactions    Flagyl [Metronidazole] Nausea Only    Keflex [Cephalexin] Rash    Macrobid [Nitrofurantoin Monohyd/M-Cryst] Nausea Only    Vancomycin Nausea Only       Social History     Tobacco Use    Smoking status: Never Smoker    Smokeless tobacco: Never Used   Substance Use Topics    Alcohol use:  Yes     Alcohol/week: 0.0 standard drinks     Comment: 1x per month    Drug use: No       Family History   Problem Relation Age of Onset    Hypertension Father        Family Status   Relation Name Status    Father  (Not Specified)       Review of Systems:   ROS    Positive in BOLD  CONST: Fever, weight loss, fatigue or chills  GI: Nausea, vomiting, abdominal pain, change in bowel habits, hematochezia, melena, and GERD   INTEG: Dermatitis, abnormal moles  HEENT: Recent changes in vision, vertigo, epistaxis, dysphagia and hoarseness  CV: Chest pain, palpitations, HTN, edema and varicosities  RESP: Cough, shortness of breath, wheezing, hemoptysis, snoring and reactive airway disease  : Hematuria, dysuria, frequency, urgency, nocturia and stress urinary incontinence   MS: Weakness, joint pain and arthritis  ENDO: Diabetes, thyroid disease, polyuria, polydipsia, polyphagia, poor wound healing, heat intolerance, cold intolerance  LYMPH/HEME: Anemia, bruising and history of blood transfusions  NEURO: Dizziness, headache, fainting, seizures and stroke  PSYCH: Anxiety and depression      Physical Exam    Visit Vitals  /89 (BP 1 Location: Left arm, BP Patient Position: At rest)   Pulse 76   Temp 96.8 °F (36 °C) (Oral)   Resp 20   Ht 4' 11\" (1.499 m)   Wt 88.9 kg (196 lb)   BMI 39.59 kg/m²               General: AAOX3, pleasant and cooperative to exam. Appropriately groomed. NAD. Non-toxic in appearance. Appears stated age. HENT: NC/AT. PERRLA. Extraocular motions are intact. Sclera anicteric, Conjunctiva Clear. Nares clear. Oropharynx pink, moist without exudate or erythema. Uvula Midline. Neck:  Supple, trachea is midline. No JVD, Lymphadenopathy. No bruits. Chest: Good equal bilateral expansion  Lungs: Clear to auscultation bilaterally without e/o crackles, wheezes or rhales. Heart: RRR, S1 and S2 noted. No c/r/m/g/vpmi. Abdomen: obese, soft and non-tender without distension. Good bowel sounds. No vis/palp masses or pulsations. No organo-splenomegaly. No hernias to my exam. No e/o acute abdomen or peritoneal signs. Pelvis: Stable. :  Deferred  Rectal: Deferred  Extremities: Positive pulses in all 4 extremities. Baseline range of motion in all 4 extremities. Strength, sensation and reflexes intact, appropriate and equal in b/l upper and lower extremities. No C/C/E  Neuro: CN II-XII grossly intact without focal deficit. Ambulatory. Skin: Clean, warm and dry. Workup  Labs:Pending    EKG:Pending     Assessment/Plan  Ivania Lorenzo is a 26 y.o. female who is suffering from obesity with a BMI of Body mass index is 39.59 kg/m².   and comorbidities including hypertension and stroke   who would benefit from weight loss. ICD-10-CM ICD-9-CM    1. Essential hypertension  I10 401.9 CBC WITH AUTOMATED DIFF      URINALYSIS W/MICROSCOPIC      METABOLIC PANEL, COMPREHENSIVE      TSH 3RD GENERATION      LIPID PANEL      EKG, 12 LEAD, INITIAL      MAGNESIUM      URIC ACID      VITAMIN D, 25 HYDROXY   2. Severe obesity (BMI 35.0-39. 9) with comorbidity ([facility] [address]  E66.01 278.01 CBC WITH AUTOMATED DIFF      URINALYSIS W/MICROSCOPIC      METABOLIC PANEL, COMPREHENSIVE      TSH 3RD GENERATION      LIPID PANEL      EKG, 12 LEAD, INITIAL      MAGNESIUM      URIC ACID      VITAMIN D, 25 HYDROXY   3. Weight loss counseling, encounter for  Z71.3 V65.3 CBC WITH AUTOMATED DIFF      URINALYSIS W/MICROSCOPIC      METABOLIC PANEL, COMPREHENSIVE      TSH 3RD GENERATION      LIPID PANEL      EKG, 12 LEAD, INITIAL      MAGNESIUM      URIC ACID      VITAMIN D, 25 HYDROXY   4. Dietary counseling and surveillance  Z71.3 V65.3 CBC WITH AUTOMATED DIFF      URINALYSIS W/MICROSCOPIC      METABOLIC PANEL, COMPREHENSIVE      TSH 3RD GENERATION      LIPID PANEL      EKG, 12 LEAD, INITIAL      MAGNESIUM      URIC ACID      VITAMIN D, 25 HYDROXY   5. Inappropriate diet and eating habits  Z72.4 V69.1 CBC WITH AUTOMATED DIFF      URINALYSIS W/MICROSCOPIC      METABOLIC PANEL, COMPREHENSIVE      TSH 3RD GENERATION      LIPID PANEL      EKG, 12 LEAD, INITIAL      MAGNESIUM      URIC ACID      VITAMIN D, 25 HYDROXY       Diet regimen   # of meal replacements prescribed: 4 MR     If modified LCD-nutritional guidelines:    Monthly Goal   Need clearance from rheumatologist before starting diet    Monthly goal: 10-15lbs      3 meal replacements daily, 1 grocery list meal.  First one at 8 am to get metabolism started  Second meal replacement at 12,   third replacement at [employer] before 8pm as we discussed  Don't go more than 4 hours between meals. No more than 1 soup a day, this is too much salt.    No more than 1 bar a day, this not enough protein. You are alotted 1200 calories per day    Medical monitoring schedule:   Weekly BP/Weight checks   Monthly provider appointments              Monthly CMP, uric acid checks      I have reviewed/discussed the above normal BMI with the patient. I have recommended the following interventions: dietary management education, guidance, and counseling, encourage exercise, monitor weight and prescribed dietary intake . .      Ms. Yoshi Schmitz has a reminder for a \"due or due soon\" health maintenance. I have asked that she contact her primary care provider for follow-up on this health maintenance.     Vishnu Perea, FNP-BC

## 2020-10-20 NOTE — PATIENT INSTRUCTIONS
If you have any questions or concerns about today's appointment, the verbal and/or written instructions you were given for follow up care, please call our office at [phone]. [facility] [address] [place] [address], [address] [place] [phone] office [phone] Orders Placed This Encounter  CBC WITH AUTOMATED DIFF Standing Status:   Future Standing Expiration Date:   10/20/2021  URINALYSIS W/MICROSCOPIC Standing Status:   Future Standing Expiration Date:   10/20/2021  METABOLIC PANEL, COMPREHENSIVE Standing Status:   Future Standing Expiration Date:   10/20/2021  
 TSH 3RD GENERATION Standing Status:   Future Standing Expiration Date:   10/20/2021  LIPID PANEL Standing Status:   Future Standing Expiration Date:   10/20/2021  MAGNESIUM Standing Status:   Future Standing Expiration Date:   10/21/2021  URIC ACID Standing Status:   Future Standing Expiration Date:   10/20/2021  VITAMIN D, 25 HYDROXY Standing Status:   Future Standing Expiration Date:   10/21/2021  EKG, 12 LEAD, INITIAL Standing Status:   Future Standing Expiration Date:   4/20/2021 Order Specific Question:   Reason for Exam: Answer:   medical weight loss Monthly goal: 10-15lbs 
   
3 meal replacements daily, 1 grocery list meal. 
First one at 8 or 890 am to get metabolism started Second meal replacement at 12, third replacement at [employer] before 8pm as we discussed Don't go more than 4 hours between meals. No more than 1 soup a day, this is too much salt. No more than 1 bar a day, this not enough protein. You are alotted 1200 calories per day Weekly weight check and monthly provider visit with labs

## 2020-10-21 ENCOUNTER — HOSPITAL ENCOUNTER (OUTPATIENT)
Dept: LAB | Age: 30
Discharge: HOME | End: 2020-10-21

## 2020-10-21 ENCOUNTER — TRANSCRIBE ORDER (OUTPATIENT)
Dept: REGISTRATION | Age: 30
End: 2020-10-21

## 2020-10-21 ENCOUNTER — APPOINTMENT (OUTPATIENT)
Dept: LAB | Age: 30
End: 2020-10-21
Payer: COMMERCIAL

## 2020-10-21 ENCOUNTER — HOSPITAL ENCOUNTER (OUTPATIENT)
Dept: LAB | Age: 30
Discharge: HOME | End: 2020-10-21
Payer: COMMERCIAL

## 2020-10-21 DIAGNOSIS — E66.01 MORBID OBESITY (HCC): ICD-10-CM

## 2020-10-21 DIAGNOSIS — Z71.3 DIETARY COUNSELING: ICD-10-CM

## 2020-10-21 DIAGNOSIS — I10 ESSENTIAL HYPERTENSION, MALIGNANT: ICD-10-CM

## 2020-10-21 DIAGNOSIS — Z71.41 ENCOUNTER FOR ALCOHOLISM COUNSELING: ICD-10-CM

## 2020-10-21 DIAGNOSIS — I10 ESSENTIAL HYPERTENSION, MALIGNANT: Primary | ICD-10-CM

## 2020-10-21 LAB
ATRIAL RATE: 78 BPM
CALCULATED P AXIS, ECG09: 28 DEGREES
CALCULATED R AXIS, ECG10: 33 DEGREES
CALCULATED T AXIS, ECG11: 13 DEGREES
DIAGNOSIS, 93000: NORMAL
P-R INTERVAL, ECG05: 156 MS
Q-T INTERVAL, ECG07: 386 MS
QRS DURATION, ECG06: 84 MS
QTC CALCULATION (BEZET), ECG08: 440 MS
SENTARA SPECIMEN COL,SENBCF: NORMAL
VENTRICULAR RATE, ECG03: 78 BPM

## 2020-10-21 PROCEDURE — 99001 SPECIMEN HANDLING PT-LAB: CPT

## 2020-10-21 PROCEDURE — 93005 ELECTROCARDIOGRAM TRACING: CPT

## 2020-10-22 LAB
25(OH)D3 SERPL-MCNC: 39.3 NG/ML (ref 32–100)
A-G RATIO,AGRAT: 1.5 RATIO (ref 1.1–2.6)
ABSOLUTE LYMPHOCYTE COUNT, 10803: 2.6 K/UL (ref 1–4.8)
ALBUMIN SERPL-MCNC: 4 G/DL (ref 3.5–5)
ALP SERPL-CCNC: 76 U/L (ref 25–115)
ALT SERPL-CCNC: 13 U/L (ref 5–40)
ANION GAP SERPL CALC-SCNC: 14 MMOL/L (ref 3–15)
AST SERPL W P-5'-P-CCNC: 18 U/L (ref 10–37)
BACTERIA,BACTU: PRESENT
BASOPHILS # BLD: 0 K/UL (ref 0–0.2)
BASOPHILS NFR BLD: 0 % (ref 0–2)
BILIRUB SERPL-MCNC: 0.2 MG/DL (ref 0.2–1.2)
BILIRUB UR QL: NEGATIVE
BUN SERPL-MCNC: 16 MG/DL (ref 6–22)
CALCIUM SERPL-MCNC: 9.2 MG/DL (ref 8.4–10.5)
CHLORIDE SERPL-SCNC: 103 MMOL/L (ref 98–110)
CHOLEST SERPL-MCNC: 180 MG/DL (ref 110–200)
CLARITY: ABNORMAL
CO2 SERPL-SCNC: 24 MMOL/L (ref 20–32)
COLOR UR: YELLOW
CREAT SERPL-MCNC: 0.9 MG/DL (ref 0.5–1.2)
EOSINOPHIL # BLD: 0.1 K/UL (ref 0–0.5)
EOSINOPHIL NFR BLD: 2 % (ref 0–6)
EPITHELIAL,EPSU: ABNORMAL /HPF (ref 0–2)
ERYTHROCYTE [DISTWIDTH] IN BLOOD BY AUTOMATED COUNT: 14.8 % (ref 10–15.5)
GFRAA, 66117: >60
GFRNA, 66118: >60
GLOBULIN,GLOB: 2.6 G/DL (ref 2–4)
GLUCOSE SERPL-MCNC: 85 MG/DL (ref 70–99)
GLUCOSE UR QL: NEGATIVE MG/DL
GRANULOCYTES,GRANS: 45 % (ref 40–75)
HCT VFR BLD AUTO: 43.7 % (ref 35.1–46.5)
HDLC SERPL-MCNC: 4.2 MG/DL (ref 0–5)
HDLC SERPL-MCNC: 43 MG/DL
HGB BLD-MCNC: 12 G/DL (ref 11.7–15.5)
HGB UR QL STRIP: NEGATIVE
HYLINE CAST, 6014: ABNORMAL /LPF
HYPOCHROMIA, 12007: ABNORMAL
KETONES UR QL STRIP.AUTO: NEGATIVE MG/DL
LDL/HDL RATIO,LDHD: 2.5
LDLC SERPL CALC-MCNC: 107 MG/DL (ref 50–99)
LEUKOCYTE ESTERASE: NEGATIVE
LYMPHOCYTES, LYMLT: 44 % (ref 20–45)
MAGNESIUM SERPL-MCNC: 2 MG/DL (ref 1.6–2.5)
MCH RBC QN AUTO: 25 PG (ref 26–34)
MCHC RBC AUTO-ENTMCNC: 28 G/DL (ref 31–36)
MCV RBC AUTO: 91 FL (ref 81–99)
MONOCYTES # BLD: 0.5 K/UL (ref 0.1–1)
MONOCYTES NFR BLD: 8 % (ref 3–12)
NEUTROPHILS # BLD AUTO: 2.6 K/UL (ref 1.8–7.7)
NITRITE UR QL STRIP.AUTO: NEGATIVE
NON-HDL CHOLESTEROL, 011976: 137 MG/DL
PH UR STRIP: 5 PH (ref 5–8)
PLATELET # BLD AUTO: 383 K/UL (ref 140–440)
PMV BLD AUTO: 10.1 FL (ref 9–13)
POTASSIUM SERPL-SCNC: 4.5 MMOL/L (ref 3.5–5.5)
PROT SERPL-MCNC: 6.6 G/DL (ref 6.4–8.3)
PROT UR QL STRIP: ABNORMAL MG/DL
RBC # BLD AUTO: 4.82 M/UL (ref 3.8–5.2)
RBC #/AREA URNS HPF: ABNORMAL /HPF
SMEAR EVAL, 1131: ABNORMAL
SODIUM SERPL-SCNC: 141 MMOL/L (ref 133–145)
SP GR UR: 1.02 (ref 1–1.03)
TRIGL SERPL-MCNC: 151 MG/DL (ref 40–149)
TSH SERPL DL<=0.005 MIU/L-ACNC: 3.7 MCU/ML (ref 0.27–4.2)
URATE SERPL-MCNC: 8.8 MG/DL (ref 2.2–7.7)
URINE ASCORBIC ACID: NEGATIVE MG/DL
UROBILINOGEN UR STRIP-MCNC: <2 MG/DL
VLDLC SERPL CALC-MCNC: 30 MG/DL (ref 8–30)
WBC # BLD AUTO: 5.8 K/UL (ref 4–11)
WBC URNS QL MICRO: ABNORMAL /HPF (ref 0–2)

## 2020-10-26 NOTE — PROGRESS NOTES
Labs reviewed with patient. Encouraged patient to see pcp regarding hyperuricemia. Patient will proceed with LCD vs VLCD due to elevated uric acid level.

## 2020-10-28 ENCOUNTER — CLINICAL SUPPORT (OUTPATIENT)
Dept: SURGERY | Age: 30
End: 2020-10-28

## 2020-10-28 VITALS — WEIGHT: 190 LBS | BODY MASS INDEX: 38.38 KG/M2

## 2020-10-28 DIAGNOSIS — Z71.3 WEIGHT LOSS COUNSELING, ENCOUNTER FOR: Primary | ICD-10-CM

## 2020-10-28 NOTE — PROGRESS NOTES
Patient attended a weekly class as part of the Medically Supervised Weight Loss Program.  This class was facilitated by a Registered Dietitian. Topics taught at class focused on diet, particularly carbohydrate counting and portion control. Classes also focused on behavior changes and the importance of establishing a daily exercise routine. Progress Note: Weekly Medical Monitoring in the [facility] Weight Loss Program    Is there anything that you or the patient needs to let the supervising provider know about? no    Over the past week, have you experienced any side-effects? no    Torri Rg is a 26 y.o. female who is enrolled in [facility] Weight Loss Program    Torri Rg was prescribed the VLCD / LCD. Visit Vitals  Wt 86.2 kg (190 lb)   BMI 38.38 kg/m²     Weight Metrics 10/28/2020 10/20/2020 10/20/2020 9/20/2017 4/4/2017 6/8/2016 5/23/2016   Weight 190 lb - 196 lb 183 lb 195 lb 185 lb 9.6 oz 188 lb 3 oz   Waist Measure Inches - 36 - - - - -   Body Fat % - 42.5 - - - - -   BMI 38.38 kg/m2 - 39.59 kg/m2 36.96 kg/m2 39.39 kg/m2 37.47 kg/m2 37.99 kg/m2         Have you received any other medical care this week? no  If yes, where and for what? Have you had any change in your medications since your last visit? no  If yes what? Did you have any problems adhering to the program last week? no  If yes, please explain:       Eating Habits Over Last Week:  Did you take in 64 oz of non-caloric fluids?  no     Did you consume your prescribed meal replacement regimen each day? no       Physical Activity Over the Past Week:    Aerobic exercise: 0 min  Resistance exercise: 0 workouts / week

## 2020-11-04 ENCOUNTER — CLINICAL SUPPORT (OUTPATIENT)
Dept: SURGERY | Age: 30
End: 2020-11-04

## 2020-11-04 VITALS — WEIGHT: 189 LBS | BODY MASS INDEX: 38.17 KG/M2

## 2020-11-04 DIAGNOSIS — Z71.3 DIETARY COUNSELING AND SURVEILLANCE: Primary | ICD-10-CM

## 2020-11-04 NOTE — PROGRESS NOTES
Patient attended a weekly class as part of the Medically Supervised Weight Loss Program.  This class was facilitated by a Registered Dietitian. Topics taught at class focused on diet, particularly carbohydrate counting and portion control. Classes also focused on behavior changes and the importance of establishing a daily exercise routine. Progress Note: Weekly Medical Monitoring in the [facility] Weight Loss Program    Is there anything that you or the patient needs to let the supervising provider know about? no    Over the past week, have you experienced any side-effects? no    Itzel Wadsworth is a 26 y.o. female who is enrolled in [facility] Weight Loss Program    Itzel Wadsworth was prescribed the VLCD / LCD. Visit Vitals  Wt 85.7 kg (189 lb)   BMI 38.17 kg/m²     Weight Metrics 11/4/2020 10/28/2020 10/20/2020 10/20/2020 9/20/2017 4/4/2017 6/8/2016   Weight 189 lb 190 lb - 196 lb 183 lb 195 lb 185 lb 9.6 oz   Waist Measure Inches - - 36 - - - -   Body Fat % - - 42.5 - - - -   BMI 38.17 kg/m2 38.38 kg/m2 - 39.59 kg/m2 36.96 kg/m2 39.39 kg/m2 37.47 kg/m2         Have you received any other medical care this week? no  If yes, where and for what? Have you had any change in your medications since your last visit? no  If yes what? Did you have any problems adhering to the program last week? yes  If yes, please explain: \"Ate bad food at birthday party\"    Eating Habits Over Last Week:  Did you take in 64 oz of non-caloric fluids? yes     Did you consume your prescribed meal replacement regimen each day?  yes       Physical Activity Over the Past Week:    Aerobic exercise: 180 min  Resistance exercise: 0 workouts / week

## 2020-11-11 ENCOUNTER — CLINICAL SUPPORT (OUTPATIENT)
Dept: SURGERY | Age: 30
End: 2020-11-11

## 2020-11-11 VITALS — BODY MASS INDEX: 37.7 KG/M2 | WEIGHT: 187 LBS | HEIGHT: 59 IN

## 2020-11-11 DIAGNOSIS — Z71.3 DIETARY COUNSELING AND SURVEILLANCE: Primary | ICD-10-CM

## 2020-11-11 NOTE — PROGRESS NOTES
Patient attended a weekly class as part of the Medically Supervised Weight Loss Program.  This class was facilitated by a Registered Dietitian. Topics taught at class focused on diet, particularly carbohydrate counting and portion control. Classes also focused on behavior changes and the importance of establishing a daily exercise routine. Progress Note: Weekly Medical Monitoring in the [facility] Weight Loss Program    Is there anything that you or the patient needs to let the supervising provider know about? no    Over the past week, have you experienced any side-effects? no    Walt Shipley is a 26 y.o. female who is enrolled in [facility] Weight Loss Program    Walt Shipley was prescribed the LCD. Visit Vitals  Ht 4' 11\" (1.499 m)   Wt 187 lb (84.8 kg)   BMI 37.77 kg/m²     Weight Metrics 11/11/2020 11/4/2020 10/28/2020 10/20/2020 10/20/2020 9/20/2017 4/4/2017   Weight 187 lb 189 lb 190 lb - 196 lb 183 lb 195 lb   Waist Measure Inches - - - 36 - - -   Body Fat % - - - 42.5 - - -   BMI 37.77 kg/m2 38.17 kg/m2 38.38 kg/m2 - 39.59 kg/m2 36.96 kg/m2 39.39 kg/m2         Have you received any other medical care this week? no  If yes, where and for what? Have you had any change in your medications since your last visit? no  If yes what? Did you have any problems adhering to the program last week? no  If yes, please explain:       Eating Habits Over Last Week:  Did you take in 64 oz of non-caloric fluids?  yes     Did you consume your prescribed meal replacement regimen each day? no       Physical Activity Over the Past Week:    Aerobic exercise: 7 hours 30 min  Resistance exercise: 0 workouts / week

## 2020-11-20 ENCOUNTER — OFFICE VISIT (OUTPATIENT)
Dept: SURGERY | Age: 30
End: 2020-11-20
Payer: COMMERCIAL

## 2020-11-20 VITALS
DIASTOLIC BLOOD PRESSURE: 83 MMHG | HEART RATE: 92 BPM | TEMPERATURE: 97.4 F | BODY MASS INDEX: 37.5 KG/M2 | HEIGHT: 59 IN | OXYGEN SATURATION: 94 % | SYSTOLIC BLOOD PRESSURE: 138 MMHG | WEIGHT: 186 LBS

## 2020-11-20 DIAGNOSIS — E66.01 SEVERE OBESITY (BMI 35.0-39.9) WITH COMORBIDITY (HCC): ICD-10-CM

## 2020-11-20 DIAGNOSIS — I10 ESSENTIAL HYPERTENSION: ICD-10-CM

## 2020-11-20 DIAGNOSIS — Z72.4 INAPPROPRIATE DIET AND EATING HABITS: ICD-10-CM

## 2020-11-20 DIAGNOSIS — Z71.3 DIETARY COUNSELING AND SURVEILLANCE: ICD-10-CM

## 2020-11-20 DIAGNOSIS — Z71.3 WEIGHT LOSS COUNSELING, ENCOUNTER FOR: ICD-10-CM

## 2020-11-20 DIAGNOSIS — Z71.3 DIETARY COUNSELING AND SURVEILLANCE: Primary | ICD-10-CM

## 2020-11-20 PROCEDURE — 99213 OFFICE O/P EST LOW 20 MIN: CPT | Performed by: NURSE PRACTITIONER

## 2020-11-20 RX ORDER — HYDROXYCHLOROQUINE SULFATE 200 MG/1
TABLET, FILM COATED ORAL
COMMUNITY
Start: 2020-11-19

## 2020-11-20 NOTE — PATIENT INSTRUCTIONS
4 Meal Replacements per day First MR within about 1 hour of waking up to get metabolism started Don't go more than 4 hours between meals. No more than 1 soup a day, this is too much salt. No more than 1 bar a day, this not enough protein. Have Meal replacements at 8 am, 12pm, 4pm, and last 8 pm.  
 
Orders Placed This Encounter  URIC ACID Standing Status:   Future Standing Expiration Date:   11/20/2021  METABOLIC PANEL, COMPREHENSIVE Standing Status:   Future Standing Expiration Date:   11/21/2021

## 2020-11-20 NOTE — PROGRESS NOTES
[facility] Progress Note:   F/up Provider Visit     CC: Weight Management      Travis Nye is a 26 y.o. female who is here for her  f/up medical provider visit for the LCD Program. She is down 10 lbs since last visit. She is doing well with 3 MR and 1 grocery list meal. She would to transition to VLCD, we are waiting for clearance from rheumatology. Weight History    Ideal body weight: 48.8 kg (107 lb 8.4 oz)  Adjusted ideal body weight: 63 kg (138 lb 14.6 oz) (Based on [employer] Tables)  Goal weight: 150 lbs     Wt Readings from Last 10 Encounters:   11/20/20 84.4 kg (186 lb)   11/11/20 84.8 kg (187 lb)   11/04/20 85.7 kg (189 lb)   10/28/20 86.2 kg (190 lb)   10/20/20 88.9 kg (196 lb)   09/20/17 83 kg (183 lb)   04/04/17 88.5 kg (195 lb)   06/08/16 84.2 kg (185 lb 9.6 oz)   05/23/16 85.4 kg (188 lb 3 oz)   05/04/16 85.3 kg (188 lb)       Weight Metrics 11/20/2020 11/20/2020 11/11/2020 11/4/2020 10/28/2020 10/20/2020 10/20/2020   Weight - 186 lb 187 lb 189 lb 190 lb - 196 lb   Waist Measure Inches 36 - - - - 36 -   Body Fat % - - - - - 42.5 -   BMI - 37.57 kg/m2 37.77 kg/m2 38.17 kg/m2 38.38 kg/m2 - 39.59 kg/m2             History    Past Medical History:   Diagnosis Date    Cancer ([address] JAN 2015    CERVICAL--HAD CHEMO & RADIATION    Hypertension     [address] [address]     Nephritis     Stroke ([facility] [address]     nov.-2013       Past Surgical History:   Procedure Laterality Date    HX GYN      Brachy therapy    HX OTHER SURGICAL      fatty tissue from abdomen removed    CA EXC TUMOR SOFT TISSUE ABDOMINAL WALL SUBQ 3+CM  12/16/2015    Dr. Camargo Hidalicia  Left and Right side abdomen       Current Outpatient Medications   Medication Sig Dispense Refill    hydrOXYchloroQUINE (PLAQUENIL) 200 mg tablet       losartan (COZAAR) 25 mg tablet       Xarelto 20 mg tab tablet       predniSONE (DELTASONE) 10 mg tablet Take 8 mg by mouth daily (with breakfast).       mycophenolate (CELLCEPT) 500 mg tablet Take 500 mg by mouth two (2) times a day. Allergies   Allergen Reactions    Flagyl [Metronidazole] Nausea Only    Keflex [Cephalexin] Rash    Macrobid [Nitrofurantoin Monohyd/M-Cryst] Nausea Only    Vancomycin Nausea Only       Social History     Tobacco Use    Smoking status: Never Smoker    Smokeless tobacco: Never Used   Substance Use Topics    Alcohol use: Not Currently     Alcohol/week: 0.0 standard drinks     Comment: 1x per month    Drug use: No       Family History   Problem Relation Age of Onset    Hypertension Father        Family Status   Relation Name Status    Father  (Not Specified)           Review of Systems  Positives in [place]    Constitutional:  Unexpected weight loss, night sweats,fatigue/maliase/lethargy, change in sleep, fever, rash  Eyes:  Visual changes, headaches, eye pain, floaters  ENT:  Rhinorrhea, epistaxis, sinus pain, change in hearing, gingival bleeding, sore throat, dysphagia, dysphonia  CV:  Chest pain, shortness of breath, difficulty breathing, orthopnea, palpitations, loss of consciousness, claudication  Resp: Cough, wheeze, haemoptysis, sob, exercise intolerence  GI:  Abdominal pain, dysphagia, reflux, bloating, cramping. Obstipation, haematemesis, brbpr, hematochezia,dark tarry stools. Nausea, vomitting, diarrhea, constipation. : Change in frequency of urination, dysuria, hematuria, change in force of Stream  Neuro: Paresthesias, numbness, weakness, changes in balance, changes in speech, loss of control of bowel or bladder,   Psych:Changes in depression, anxiety, sleep patterns, change in energy Levels  Endocrine: Temperature intolerance, dry skin, hair loss, fatigue,  Episodes of hypoglycemia, changes in libido  Heme: Anemia, pupura, petechia  Skin: Rashes, itchiness, excessive bruising    Remainder of ROS as per HPI.       Since your last visit, have you experienced any complications? no  If yes, please list:     Are you taking an appetite suppressant? no  If so:  Do you need a refill? no  Are you experiencine any Chest Pain, Palpitations or Dizziness? no    BP Readings from Last 3 Encounters:   11/20/20 138/83   10/20/20 135/89   09/20/17 117/69           Have you received any other medical care this week? no  If yes, where and for what? Have you discontinued or changed any medicine or dose of your medicine(s) since your last visit with [facility] Weight Loss provider? no    If yes, where and for what? Diet  How many ounces of calorie-free liquids did you consume each day? 64 oz    How many meal replacements did you take each day? 3 MR     Did you have any problems adhering to the program?  no   If yes, please explain:      Exercise  Aerobic exercise: 60 min walking daily  Resistance exercise: 0 workouts / week  Any discomfort?  no     If yes, where? Objective  Visit Vitals  /83 (BP 1 Location: Right arm, BP Patient Position: Sitting)   Pulse 92   Temp 97.4 °F (36.3 °C)   Ht 4' 11\" (1.499 m)   Wt 84.4 kg (186 lb)   SpO2 94%   BMI 37.57 kg/m²     No LMP recorded. (Menstrual status: Chemically Induced). Physical Exam      General: AAOX3, pleasant and cooperative to exam. Appropriately groomed. NAD. Non-toxic in appearance. Appears stated age. Neck:  Trachea is midline. No visible JVD, Lymphadenopathy. Chest: Good equal bilateral expansion  Lungs: Clear to auscultation bilaterally without e/o crackles, wheezes or rhales. Heart: RRR, S1 and S2 noted. No c/r/m/g/vpmi. Neuro: CN II-XII appear to be grossly intact without focal deficit. Ambulatory. Skin: Clean, warm and dry. Assessment / Plan    Encounter Diagnoses   Name Primary?  Dietary counseling and surveillance Yes    Weight loss counseling, encounter for     Essential hypertension     Severe obesity (BMI 35.0-39. 9) with comorbidity ([address]     Inappropriate diet and eating habits        1.   Weight management    Degree of control: See below Progress was reviewed with patient after 4 weeks in the program.    Goal(s) for next appointment: See below     Number of meal replacements per day: 3 MR     2. Labs    Latest results reviewed with patient   Routine monitoring labs ordered  Orders Placed This Encounter    URIC ACID    METABOLIC PANEL, COMPREHENSIVE    hydrOXYchloroQUINE (PLAQUENIL) 200 mg tablet       3. Goals-patient will transition to VLCD   4 Meal Replacements per day              First MR within about 1 hour of waking up to get metabolism started              Don't go more than 4 hours between meals. No more than 1 soup a day, this is too much salt. No more than 1 bar a day, this not enough protein. Have Meal replacements at 8 am, 12pm, 4pm, and last 8 pm.     Waist Circumference: I personally reviewed patient's Weight Management Doc Flowsheet  Neck Circumference: I personally reviewed patient's Weight Management Doc Flowsheet  Percent Body Fat: I personally reviewed patient's Weight Management Doc Flowsheet       I have reviewed/discussed the above normal BMI with the patient. I have recommended the following interventions: dietary management education, guidance, and counseling, encourage exercise, monitor weight and prescribed dietary intake . Follow-up and Dispositions    · Return in about 1 month (around 12/20/2020).           >50% of 15 min visit spent counseling     LISA Gupta-BC

## 2020-11-20 NOTE — PROGRESS NOTES
Dylan Jimenes is a 26 y.o. female (: 1990) presenting to address:    Chief Complaint   Patient presents with    Weight Management     MWL/LCD       Medication list and allergies have been reviewed with Dylan Jimenes and updated as of today's date. I have gone over all Medical, Surgical and Social History with Dylan Jimenes and updated/added the information accordingly. 1. Have you been to the ER, Urgent Care or Hospitalized since your last visit? NO      2. Have you followed up with your PCP or any other Physicians since your procedure/ last office visit?    NO

## 2020-11-25 ENCOUNTER — CLINICAL SUPPORT (OUTPATIENT)
Dept: SURGERY | Age: 30
End: 2020-11-25

## 2020-11-25 VITALS — WEIGHT: 182 LBS | BODY MASS INDEX: 36.76 KG/M2

## 2020-11-25 DIAGNOSIS — Z71.3 DIETARY COUNSELING AND SURVEILLANCE: Primary | ICD-10-CM

## 2020-11-25 NOTE — PROGRESS NOTES
Patient attended a weekly class as part of the [facility] Program.  This class was facilitated by a Registered Dietitian. Topics taught at class focused on diet, particularly carbohydrate counting and portion control. Classes also focused on behavior changes and the importance of establishing a daily exercise routine. Progress Note: Weekly Medical Monitoring in the [facility]    Is there anything that you or the patient needs to let the supervising provider know about? no    Over the past week, have you experienced any side-effects? \"Mild Headache\"    Remberto Steele is a 26 y.o. female who is enrolled in [facility]    Remberto Steele was prescribed the VLCD / LCD. Visit Vitals  Wt 82.6 kg (182 lb)   BMI 36.76 kg/m²     Weight Metrics 11/25/2020 11/20/2020 11/20/2020 11/11/2020 11/4/2020 10/28/2020 10/20/2020   Weight 182 lb - 186 lb 187 lb 189 lb 190 lb -   Waist Measure Inches - 36 - - - - 36   Body Fat % - - - - - - 42.5   BMI 36.76 kg/m2 - 37.57 kg/m2 37.77 kg/m2 38.17 kg/m2 38.38 kg/m2 -         Have you received any other medical care this week? no  If yes, where and for what? Have you had any change in your medications since your last visit? no  If yes what? Did you have any problems adhering to the program last week? yes  If yes, please explain: \"ate one slice of pizza\"      Eating Habits Over Last Week:  Did you take in 64 oz of non-caloric fluids? yes     Did you consume your prescribed meal replacement regimen each day?  yes       Physical Activity Over the Past Week:    Aerobic exercise: 4 hour 15min  Resistance exercise: 0 workouts / week

## 2020-12-02 ENCOUNTER — CLINICAL SUPPORT (OUTPATIENT)
Dept: SURGERY | Age: 30
End: 2020-12-02

## 2020-12-02 DIAGNOSIS — Z71.3 WEIGHT LOSS COUNSELING, ENCOUNTER FOR: Primary | ICD-10-CM

## 2020-12-03 VITALS — HEIGHT: 59 IN | BODY MASS INDEX: 36.49 KG/M2 | WEIGHT: 181 LBS

## 2020-12-03 NOTE — PROGRESS NOTES
Patient attended a weekly class as part of the [facility] Loss Program.  This class was facilitated by a Registered Dietitian. Topics taught at class focused on diet, particularly carbohydrate counting and portion control. Classes also focused on behavior changes and the importance of establishing a daily exercise routine. Progress Note: Weekly Medical Monitoring in the [facility]    Is there anything that you or the patient needs to let the supervising provider know about? no    Over the past week, have you experienced any side-effects? no    Paul Armenta is a 26 y.o. female who is enrolled in [facility]    Paul Armenta was prescribed the VLCD. Visit Vitals  Ht 4' 11\" (1.499 m)   Wt 181 lb (82.1 kg)   BMI 36.56 kg/m²     Weight Metrics 12/2/2020 11/25/2020 11/20/2020 11/20/2020 11/11/2020 11/4/2020 10/28/2020   Weight 181 lb 182 lb - 186 lb 187 lb 189 lb 190 lb   Waist Measure Inches - - 36 - - - -   Body Fat % - - - - - - -   BMI 36.56 kg/m2 36.76 kg/m2 - 37.57 kg/m2 37.77 kg/m2 38.17 kg/m2 38.38 kg/m2         Have you received any other medical care this week? no  If yes, where and for what? Have you had any change in your medications since your last visit? no  If yes what? Did you have any problems adhering to the program last week? yes  If yes, please explain: Ate some real food for thanksgiving. Eating Habits Over Last Week:  Did you take in 64 oz of non-caloric fluids? no     Did you consume your prescribed meal replacement regimen each day?  yes       Physical Activity Over the Past Week:    Aerobic exercise: 60 min  Resistance exercise: 0 workouts / week

## 2020-12-10 ENCOUNTER — HOSPITAL ENCOUNTER (OUTPATIENT)
Dept: LAB | Age: 30
Discharge: HOME | End: 2020-12-10

## 2020-12-10 LAB — SENTARA SPECIMEN COL,SENBCF: NORMAL

## 2020-12-10 PROCEDURE — 99001 SPECIMEN HANDLING PT-LAB: CPT

## 2020-12-11 ENCOUNTER — TELEPHONE (OUTPATIENT)
Dept: SURGERY | Age: 30
End: 2020-12-11

## 2020-12-11 ENCOUNTER — OFFICE VISIT (OUTPATIENT)
Dept: SURGERY | Age: 30
End: 2020-12-11
Payer: COMMERCIAL

## 2020-12-11 VITALS
DIASTOLIC BLOOD PRESSURE: 91 MMHG | TEMPERATURE: 97.8 F | BODY MASS INDEX: 36.69 KG/M2 | HEART RATE: 83 BPM | HEIGHT: 59 IN | WEIGHT: 182 LBS | SYSTOLIC BLOOD PRESSURE: 146 MMHG

## 2020-12-11 DIAGNOSIS — E66.01 SEVERE OBESITY (BMI 35.0-39.9) WITH COMORBIDITY (HCC): ICD-10-CM

## 2020-12-11 DIAGNOSIS — Z72.4 INAPPROPRIATE DIET AND EATING HABITS: ICD-10-CM

## 2020-12-11 DIAGNOSIS — I10 ESSENTIAL HYPERTENSION: ICD-10-CM

## 2020-12-11 DIAGNOSIS — Z71.3 DIETARY COUNSELING AND SURVEILLANCE: ICD-10-CM

## 2020-12-11 DIAGNOSIS — E66.01 SEVERE OBESITY (BMI 35.0-39.9) WITH COMORBIDITY (HCC): Primary | ICD-10-CM

## 2020-12-11 DIAGNOSIS — Z71.3 WEIGHT LOSS COUNSELING, ENCOUNTER FOR: Primary | ICD-10-CM

## 2020-12-11 LAB — URATE SERPL-MCNC: 6.2 MG/DL (ref 2.2–7.7)

## 2020-12-11 PROCEDURE — 99212 OFFICE O/P EST SF 10 MIN: CPT | Performed by: NURSE PRACTITIONER

## 2020-12-11 NOTE — PROGRESS NOTES
Domenica Mark is a 26 y.o. female (: 1990) presenting to address:    Chief Complaint   Patient presents with    Weight Management     MWL/VLCD       Medication list and allergies have been reviewed with Domenica Mark and updated as of today's date. I have gone over all Medical, Surgical and Social History with Domenica Mark and updated/added the information accordingly. 1. Have you been to the ER, Urgent Care or Hospitalized since your last visit? NO      2. Have you followed up with your PCP or any other Physicians since your procedure/ last office visit?    NO

## 2020-12-14 DIAGNOSIS — Z71.3 DIETARY COUNSELING AND SURVEILLANCE: ICD-10-CM

## 2020-12-14 DIAGNOSIS — Z72.4 INAPPROPRIATE DIET AND EATING HABITS: ICD-10-CM

## 2020-12-14 DIAGNOSIS — I10 ESSENTIAL HYPERTENSION: ICD-10-CM

## 2020-12-14 DIAGNOSIS — Z71.3 WEIGHT LOSS COUNSELING, ENCOUNTER FOR: ICD-10-CM

## 2020-12-14 NOTE — PROGRESS NOTES
[facility] Progress Note:   F/up Provider Visit     CC: Weight Management      Travis Nye is a 26 y.o. female who is here for her  f/up medical provider visit for the VLCD. She report completing liquid diet for about a week but had difficulty adhering to meal replacement. Weight History    Ideal body weight: 48.8 kg (107 lb 8.4 oz)  Adjusted ideal body weight: 62.3 kg (137 lb 5 oz) (Based on [employer] Tables)      Wt Readings from Last 10 Encounters:   12/11/20 82.6 kg (182 lb)   12/03/20 82.1 kg (181 lb)   11/25/20 82.6 kg (182 lb)   11/20/20 84.4 kg (186 lb)   11/11/20 84.8 kg (187 lb)   11/04/20 85.7 kg (189 lb)   10/28/20 86.2 kg (190 lb)   10/20/20 88.9 kg (196 lb)   09/20/17 83 kg (183 lb)   04/04/17 88.5 kg (195 lb)       Weight Metrics 12/11/2020 12/11/2020 12/2/2020 11/25/2020 11/20/2020 11/20/2020 11/11/2020   Weight - 182 lb 181 lb 182 lb - 186 lb 187 lb   Waist Measure Inches 35 - - - 36 - -   Body Fat % - - - - - - -   BMI - 36.76 kg/m2 36.56 kg/m2 36.76 kg/m2 - 37.57 kg/m2 37.77 kg/m2             History    Past Medical History:   Diagnosis Date    Cancer ([facility] [address] JAN 2015    CERVICAL--HAD CHEMO & RADIATION    Hypertension     [email or web address]     Nephritis     St[email or web address]     nov.-2013       Past Surgical History:   Procedure Laterality Date    HX GYN      Brachy therapy    HX OTHER SURGICAL      fatty tissue from abdomen removed    RI EXC TUMOR SOFT TISSUE ABDOMINAL WALL SUBQ 3+CM  12/16/2015    Dr. Mary Jo Balderas  Left and Right side abdomen       Current Outpatient Medications   Medication Sig Dispense Refill    hydrOXYchloroQUINE (PLAQUENIL) 200 mg tablet       losartan (COZAAR) 25 mg tablet       Xarelto 20 mg tab tablet       predniSONE (DELTASONE) 10 mg tablet Take 8 mg by mouth daily (with breakfast).  mycophenolate (CELLCEPT) 500 mg tablet Take 500 mg by mouth two (2) times a day.          Allergies   Allergen Reactions    Flagyl [Metronidazole] Nausea Only    Keflex [Cephalexin] Rash    Macrobid [Nitrofurantoin Monohyd/M-Cryst] Nausea Only    Vancomycin Nausea Only       Social History     Tobacco Use    Smoking status: Never Smoker    Smokeless tobacco: Never Used   Substance Use Topics    Alcohol use: Not Currently     Alcohol/week: 0.0 standard drinks     Comment: 1x per month    Drug use: No       Family History   Problem Relation Age of Onset    Hypertension Father        Family Status   Relation Name Status    Father  (Not Specified)           Since your last visit, have you experienced any complications? no  If yes, please list:     Are you taking an appetite suppressant? no  If so:  Do you need a refill? no  Are you experiencine any Chest Pain, Palpitations or Dizziness? no    BP Readings from Last 3 Encounters:   12/11/20 (!) 146/91   11/20/20 138/83   10/20/20 135/89           Have you received any other medical care this week? no  If yes, where and for what? Have you discontinued or changed any medicine or dose of your medicine(s) since your last visit with Supervised Weight Loss provider? no    If yes, where and for what? Diet  How many ounces of calorie-free liquids did you consume each day? 64 oz    How many meal replacements did you take each day? 3 to 4     Did you have any problems adhering to the program?  yes   If yes, please explain: was very hungry and craving carbs       Exercise  Aerobic exercise: minimal due to work schedule  Resistance exercise: 0 workouts / week  Any discomfort?  no     If yes, where? Objective  Visit Vitals  BP (!) 146/91 (BP 1 Location: Right arm, BP Patient Position: Sitting)   Pulse 83   Temp 97.8 °F (36.6 °C)   Ht 4' 11\" (1.499 m)   Wt 82.6 kg (182 lb)   BMI 36.76 kg/m²     No LMP recorded. (Menstrual status: Chemically Induced). Physical Exam      General: AAOX3, pleasant and cooperative to exam. Appropriately groomed. NAD. Non-toxic in appearance.  Appears stated age.   Mardy Kanner: NC/AT. PERRLA. Extraocular motions are intact. Sclera anicteric, Conjunctiva Clear. Nares clear. Oropharynx pink, moist without exudate or erythema. Uvula Midline. Neck:  Trachea is midline. No visible JVD, Lymphadenopathy. Chest: Good equal bilateral expansion  Lungs: Clear to auscultation bilaterally without e/o crackles, wheezes or rhales. Heart: RRR, S1 and S2 noted. No c/r/m/g/vpmi. Abdomen: obese, soft and non-tender without distension. Good bowel sounds. No vis/palp masses or pulsations. No organo-splenomegaly. No hernias to my exam. No e/o acute abdomen or peritoneal signs. :  Deferred  Rectal: Deferred  Extremities:  No C/C/E  Neuro: CN II-XII appear to be grossly intact without focal deficit. Ambulatory. Skin: Clean, warm and dry. Assessment / Plan    Encounter Diagnoses   Name Primary?  Weight loss counseling, encounter for Yes    Dietary counseling and surveillance     Essential hypertension     Inappropriate diet and eating habits        1. Weight management    Degree of control: need to get on track    Progress was reviewed with patient after 4 weeks in the program.    Goal(s) for next appointment: see below    Number of meal replacements per day: 4         2. Labs    Latest results reviewed with patient   Routine monitoring labs ordered  Orders Placed This Encounter    METABOLIC PANEL, COMPREHENSIVE       3. Goals   Repeat CMP   Adhere to 4 meal replacement   MR every 4 hours starting at 0800 and stopping at 8pm   -10 lbs     Waist Circumference: I personally reviewed patient's Weight Management Doc Flowsheet  Neck Circumference: I personally reviewed patient's Weight Management Doc Flowsheet  Percent Body Fat: I personally reviewed patient's Weight Management Doc Flowsheet       I have reviewed/discussed the above normal BMI with the patient.   I have recommended the following interventions: dietary management education, guidance, and counseling, encourage exercise, monitor weight and prescribed dietary intake . Follow-up and Dispositions    · Return in about 1 month (around 1/11/2021).           >50% of 10 min visit spent counseling     JARETT Cantu

## 2020-12-14 NOTE — PATIENT INSTRUCTIONS
Orders Placed This Encounter  METABOLIC PANEL, COMPREHENSIVE Standing Status:   Future Standing Expiration Date:   12/15/2021

## 2021-02-18 ENCOUNTER — OFFICE VISIT (OUTPATIENT)
Dept: SURGERY | Age: 31
End: 2021-02-18
Payer: COMMERCIAL

## 2021-02-18 DIAGNOSIS — M32.9 LUPUS (HCC): ICD-10-CM

## 2021-02-18 DIAGNOSIS — Z71.3 WEIGHT LOSS COUNSELING, ENCOUNTER FOR: ICD-10-CM

## 2021-02-18 DIAGNOSIS — Z71.3 DIETARY COUNSELING AND SURVEILLANCE: ICD-10-CM

## 2021-02-18 DIAGNOSIS — E66.9 OBESITY (BMI 30-39.9): ICD-10-CM

## 2021-02-18 DIAGNOSIS — I10 ESSENTIAL HYPERTENSION: Primary | ICD-10-CM

## 2021-02-18 PROCEDURE — 99214 OFFICE O/P EST MOD 30 MIN: CPT | Performed by: NURSE PRACTITIONER

## 2021-02-18 NOTE — PROGRESS NOTES
Ruhs Templeton is a 26 y.o. female  Chief Complaint   Patient presents with    Weight Management     [address]     Nursing Note for Medical Monitoring in the [facility] Program      Rush Templeton is a 26 y.o. female who is enrolled in [facility]    Rush Templeton was prescribed the VLCD / LCD.

## 2021-02-18 NOTE — PROGRESS NOTES
[facility] Progress Note:   F/up Provider Visit    CC: Weight Management    Sunshine Sandoval is a 26 y.o. female who is here for her  f/up medical provider visit for the VLCD Program. she dod attend class last week. Food coping s/p trauma of student passing and NP Evangelina Picking leaving.   -10lbs   Weight History  Weight Metrics 2/18/2021 12/11/2020 12/11/2020 12/2/2020 11/25/2020 11/20/2020 11/20/2020   Weight 186 lb - 182 lb 181 lb 182 lb - 186 lb   Waist Measure Inches - 35 - - - 36 -   Body Fat % - - - - - - -   BMI 37.57 kg/m2 - 36.76 kg/m2 36.56 kg/m2 36.76 kg/m2 - 37.57 kg/m2       Starting wt: 196  Goal wt: 150   Ideal body weight: 48.8 kg (107 lb 8.4 oz)  Adjusted ideal body weight: 63 kg (138 lb 14.6 oz)  Body mass index is 37.57 kg/m². History  Past Medical History:   Diagnosis Date    Cancer ([facility] [address] JAN 2015    CERVICAL--HAD CHEMO & RADIATION    Hypertension     Lupus ([facility] [address]     Nephritis     Stroke ([facility] [address]     nov.-2013     Past Surgical History:   Procedure Laterality Date    HX GYN      Brachy therapy    HX OTHER SURGICAL      fatty tissue from abdomen removed    [place] EXC TUMOR SOFT TISSUE ABDOMINAL WALL SUBQ 3+CM  12/16/2015    Dr. Kristine Hannon  Left and Right side abdomen     Current Outpatient Medications   Medication Sig Dispense Refill    hydrOXYchloroQUINE (PLAQUENIL) 200 mg tablet       losartan (COZAAR) 25 mg tablet       Xarelto 20 mg tab tablet       predniSONE (DELTASONE) 10 mg tablet Take 8 mg by mouth daily (with breakfast).  mycophenolate (CELLCEPT) 500 mg tablet Take 500 mg by mouth two (2) times a day.        Allergies   Allergen Reactions    Flagyl [Metronidazole] Nausea Only    Keflex [Cephalexin] Rash    Macrobid [Nitrofurantoin Monohyd/M-Cryst] Nausea Only    Vancomycin Nausea Only     Social History     Tobacco Use    Smoking status: Never Smoker    Smokeless tobacco: Never Used   Substance Use Topics    Alcohol use: Not Currently Alcohol/week: 0.0 standard drinks     Comment: 1x per month    Drug use: No       Family History   Problem Relation Age of Onset    Hypertension Father        Family Status   Relation Name Status    Father  (Not Specified)         Medications:  Outpatient Medications Marked as Taking for the 2/18/21 encounter (Office Visit) with Niels Garrett NP   Medication Sig Dispense Refill    hydrOXYchloroQUINE (PLAQUENIL) 200 mg tablet       losartan (COZAAR) 25 mg tablet       Xarelto 20 mg tab tablet       predniSONE (DELTASONE) 10 mg tablet Take 8 mg by mouth daily (with breakfast).  mycophenolate (CELLCEPT) 500 mg tablet Take 500 mg by mouth two (2) times a day. Review of Systems  Review of Systems   Constitutional: Positive for weight loss. Psychiatric/Behavioral:        Grief    All other systems reviewed and are negative. Objective  Visit Vitals  BP (!) 150/80   Pulse 89   Temp 98.2 °F (36.8 °C)   Resp 18   Ht 4' 11\" (1.499 m)   Wt 84.4 kg (186 lb)   SpO2 98%   BMI 37.57 kg/m²     No LMP recorded. (Menstrual status: Chemically Induced). Physical Exam  Physical Exam  Vitals signs and nursing note reviewed. Constitutional:       Appearance: She is well-developed. She is obese. HENT:      Head: Normocephalic and atraumatic. Eyes:      Pupils: Pupils are equal, round, and reactive to light. Cardiovascular:      Rate and Rhythm: Normal rate. Heart sounds: Normal heart sounds. Pulmonary:      Effort: Pulmonary effort is normal.      Breath sounds: Normal breath sounds. Musculoskeletal: Normal range of motion. Skin:     General: Skin is warm and dry. Neurological:      General: No focal deficit present. Mental Status: She is alert and oriented to person, place, and time. Psychiatric:         Mood and Affect: Mood normal.         Behavior: Behavior normal.           No results found for this or any previous visit (from the past 672 hour(s)).       Assessment / Plan    Encounter Diagnoses   Name Primary?  Essential hypertension Yes    Lupus ([facility] [address]     BMI 37.0-37.9, adult     Weight loss counseling, encounter for     Dietary counseling and surveillance     Obesity (BMI 30-39.9)            1. Weight management      Today, the patient is  Height: 4' 11\" (149.9 cm) tall, Weight: 84.4 kg (186 lb) lbs for a Body mass index is 37.57 kg/m². is suffering from obesity  which is further complicated by hypertension. Degree of control: good weight loss, dealing with food coping, recommendations for alt coping made, counseling may be appropriate due to Grief    Progress was reviewed with patient. Goal(s) for next appointment:   -8lbs     Discussed contrave for craving and overeating behaviors, patient then called cost of med too high, requesting phentermine   Pt desires start phentermine--reviewed potential SE including: elevated HR, BP, increased anxiety, insomnia, constipation  Pt to start with 37.5mg tablet--1/2 tab early am--add 2nd half after 1st week if needs increased suppression, but no later than noon to avoid insomnia. Gave 30 tabs, no RF--pt understands will need monthly visits        I have reviewed/discussed the above normal BMI with the patient. I have recommended the following interventions: dietary management education, guidance, and counseling, encourage exercise, monitor weight and prescribed dietary intake . Sunshine Mensah 2.  Labs    Latest results reviewed with patient   Routine monitoring labs ordered  Orders Placed This Encounter    HEMOGLOBIN A1C WITH EAG    DISCONTD: naltrexone-buPROPion (CONTRAVE) 8-90 mg TbER ER tablet    phentermine (ADIPEX-P) 37.5 mg tablet           JARETT De Leon     Dragon medical dictation software was used for portions of this report. Unintended transcription errors may occur.

## 2021-02-22 RX ORDER — PHENTERMINE HYDROCHLORIDE 37.5 MG/1
TABLET ORAL
Qty: 30 TAB | Refills: 0 | Status: SHIPPED | OUTPATIENT
Start: 2021-02-22 | End: 2021-03-18 | Stop reason: SDUPTHER

## 2021-02-24 ENCOUNTER — CLINICAL SUPPORT (OUTPATIENT)
Dept: SURGERY | Age: 31
End: 2021-02-24

## 2021-02-24 VITALS
HEART RATE: 92 BPM | BODY MASS INDEX: 36.96 KG/M2 | WEIGHT: 183 LBS | DIASTOLIC BLOOD PRESSURE: 109 MMHG | SYSTOLIC BLOOD PRESSURE: 149 MMHG

## 2021-02-24 DIAGNOSIS — Z71.3 WEIGHT LOSS COUNSELING, ENCOUNTER FOR: Primary | ICD-10-CM

## 2021-02-24 NOTE — PROGRESS NOTES
Alyssa Pickard is a 26 y.o. female who presents today with   Chief Complaint   Patient presents with    Obesity     Pt is not curently taking meal replacements and is monitoring her diet choices. Did not complete a homework sheet.

## 2021-02-25 VITALS
TEMPERATURE: 98.2 F | WEIGHT: 186 LBS | HEART RATE: 89 BPM | RESPIRATION RATE: 18 BRPM | OXYGEN SATURATION: 98 % | DIASTOLIC BLOOD PRESSURE: 80 MMHG | BODY MASS INDEX: 37.5 KG/M2 | HEIGHT: 59 IN | SYSTOLIC BLOOD PRESSURE: 150 MMHG

## 2021-03-02 ENCOUNTER — CLINICAL SUPPORT (OUTPATIENT)
Dept: SURGERY | Age: 31
End: 2021-03-02

## 2021-03-02 VITALS — WEIGHT: 180 LBS | BODY MASS INDEX: 36.36 KG/M2

## 2021-03-02 DIAGNOSIS — Z71.3 DIETARY COUNSELING AND SURVEILLANCE: Primary | ICD-10-CM

## 2021-03-02 NOTE — PROGRESS NOTES
Patient attended a weekly class as part of the [facility] Weight Loss Program.  This class was facilitated by a Registered Dietitian. Topics taught at class focused on diet, particularly carbohydrate counting and portion control. Classes also focused on behavior changes and the importance of establishing a daily exercise routine. Progress Note: Weekly Medical Monitoring in the [facility]    Is there anything that you or the patient needs to let the supervising provider know about? no    Over the past week, have you experienced any side-effects? no    Alyssa Pickard is a 26 y.o. female who is enrolled in [facility]    Alyssa Pickard was prescribed the LCD. Visit Vitals  Wt 180 lb (81.6 kg)   BMI 36.36 kg/m²     Weight Metrics 3/2/2021 2/24/2021 2/18/2021 12/11/2020 12/11/2020 12/2/2020 11/25/2020   Weight 180 lb 183 lb 186 lb - 182 lb 181 lb 182 lb   Waist Measure Inches - - - 35 - - -   Body Fat % - - - - - - -   BMI 36.36 kg/m2 36.96 kg/m2 37.57 kg/m2 - 36.76 kg/m2 36.56 kg/m2 36.76 kg/m2         Have you received any other medical care this week? no  If yes, where and for what? Have you had any change in your medications since your last visit? no  If yes what? Did you have any problems adhering to the program last week? no  If yes, please explain:       Eating Habits Over Last Week:  Did you take in 64 oz of non-caloric fluids? yes     Did you consume your prescribed meal replacement regimen each day?  yes       Physical Activity Over the Past Week:    Aerobic exercise: 135 min  Resistance exercise: 0 workouts / week

## 2021-03-10 ENCOUNTER — CLINICAL SUPPORT (OUTPATIENT)
Dept: SURGERY | Age: 31
End: 2021-03-10

## 2021-03-10 VITALS — BODY MASS INDEX: 35.68 KG/M2 | WEIGHT: 177 LBS | HEIGHT: 59 IN

## 2021-03-10 DIAGNOSIS — Z71.3 ENCOUNTER FOR WEIGHT LOSS COUNSELING: Primary | ICD-10-CM

## 2021-03-10 NOTE — PROGRESS NOTES
Patient attended a weekly class as part of the Medically Supervised Weight Loss Program.  This class was facilitated by a Registered Dietitian. Topics taught at class focused on diet, particularly carbohydrate counting and portion control. Classes also focused on behavior changes and the importance of establishing a daily exercise routine. Progress Note: Weekly Medical Monitoring in the [facility]    Is there anything that you or the patient needs to let the supervising provider know about? no    Over the past week, have you experienced any side-effects? no    Quoc Greenfield is a 26 y.o. female who is enrolled in [facility]    Quoc Greenfield was prescribed the VLCD / LCD. Visit Vitals  Ht 4' 11\" (1.499 m)   Wt 177 lb (80.3 kg)   BMI 35.75 kg/m²     Weight Metrics 3/10/2021 3/2/2021 2/24/2021 2/18/2021 12/11/2020 12/11/2020 12/2/2020   Weight 177 lb 180 lb 183 lb 186 lb - 182 lb 181 lb   Waist Measure Inches - - - - 35 - -   Body Fat % - - - - - - -   BMI 35.75 kg/m2 36.36 kg/m2 36.96 kg/m2 37.57 kg/m2 - 36.76 kg/m2 36.56 kg/m2         Have you received any other medical care this week? no  If yes, where and for what? Have you had any change in your medications since your last visit? no  If yes what? Did you have any problems adhering to the program last week? no  If yes, please explain:       Eating Habits Over Last Week:  Did you take in 64 oz of non-caloric fluids?  no     Did you consume your prescribed meal replacement regimen each day? no       Physical Activity Over the Past Week:    Aerobic exercise: 0 min  Resistance exercise: 1hr workouts / week

## 2021-03-18 ENCOUNTER — OFFICE VISIT (OUTPATIENT)
Dept: SURGERY | Age: 31
End: 2021-03-18
Payer: COMMERCIAL

## 2021-03-18 VITALS
BODY MASS INDEX: 35.48 KG/M2 | HEART RATE: 77 BPM | DIASTOLIC BLOOD PRESSURE: 88 MMHG | WEIGHT: 176 LBS | TEMPERATURE: 97.6 F | SYSTOLIC BLOOD PRESSURE: 138 MMHG | RESPIRATION RATE: 18 BRPM | HEIGHT: 59 IN | OXYGEN SATURATION: 98 %

## 2021-03-18 DIAGNOSIS — Z71.3 DIETARY COUNSELING AND SURVEILLANCE: ICD-10-CM

## 2021-03-18 DIAGNOSIS — Z71.3 ENCOUNTER FOR WEIGHT LOSS COUNSELING: ICD-10-CM

## 2021-03-18 DIAGNOSIS — E66.9 OBESITY (BMI 30-39.9): Primary | ICD-10-CM

## 2021-03-18 DIAGNOSIS — Z71.3 WEIGHT LOSS COUNSELING, ENCOUNTER FOR: ICD-10-CM

## 2021-03-18 PROCEDURE — 99213 OFFICE O/P EST LOW 20 MIN: CPT | Performed by: NURSE PRACTITIONER

## 2021-03-18 RX ORDER — PHENTERMINE HYDROCHLORIDE 37.5 MG/1
TABLET ORAL
Qty: 30 TAB | Refills: 0 | Status: SHIPPED | OUTPATIENT
Start: 2021-03-18 | End: 2021-04-27 | Stop reason: SDUPTHER

## 2021-03-18 NOTE — PROGRESS NOTES
[facility] Progress Note:   F/up Provider Visit    CC: Weight Management    Cheryl Koch is a 26 y.o. female who is here for her  f/up medical provider visit for the VLCD/ LCD Program. she did attend class last week. Struggling with cravings and break though hunger  -10lbs       Weight History  Weight Metrics 3/18/2021 3/10/2021 3/2/2021 2/24/2021 2/18/2021 12/11/2020 12/11/2020   Weight 176 lb 177 lb 180 lb 183 lb 186 lb - 182 lb   Waist Measure Inches - - - - - 35 -   Body Fat % - - - - - - -   BMI 35.55 kg/m2 35.75 kg/m2 36.36 kg/m2 36.96 kg/m2 37.57 kg/m2 - 36.76 kg/m2       Starting wt: 196  Goal wt: 150    Ideal body weight: 48.8 kg (107 lb 8.4 oz)  Adjusted ideal body weight: 61.2 kg (134 lb 14.6 oz)  Body mass index is 35.55 kg/m². History    Past Medical History:   Diagnosis Date    Cancer ([facility] [address] JAN 2015    CERVICAL--HAD CHEMO & RADIATION    Hypertension     Lupus ([facility] [address]     Nephritis     Stroke ([facility] [address]     nov.-2013       Past Surgical History:   Procedure Laterality Date    HX GYN      Brachy therapy    HX OTHER SURGICAL      fatty tissue from abdomen removed    [place] EXC TUMOR SOFT TISSUE ABDOMINAL WALL SUBQ 3+CM  12/16/2015    Dr. Nicole Larchwood  Left and Right side abdomen       Current Outpatient Medications   Medication Sig Dispense Refill    phentermine (ADIPEX-P) 37.5 mg tablet Take 1/2 daily am for one week, if increased suppression needed inc to 1 tab daily, do not take after noon to avoid insomnia 30 Tab 0    hydrOXYchloroQUINE (PLAQUENIL) 200 mg tablet       losartan (COZAAR) 25 mg tablet       Xarelto 20 mg tab tablet       predniSONE (DELTASONE) 10 mg tablet Take 8 mg by mouth daily (with breakfast).  mycophenolate (CELLCEPT) 500 mg tablet Take 500 mg by mouth two (2) times a day.          Allergies   Allergen Reactions    Flagyl [Metronidazole] Nausea Only    Keflex [Cephalexin] Rash    Macrobid [Nitrofurantoin Monohyd/M-Cryst] Nausea Only    Vancomycin Nausea Only       Social History     Tobacco Use    Smoking status: Never Smoker    Smokeless tobacco: Never Used   Substance Use Topics    Alcohol use: Not Currently     Alcohol/week: 0.0 standard drinks     Comment: 1x per month    Drug use: No       Family History   Problem Relation Age of Onset    Hypertension Father        Family Status   Relation Name Status    Father  (Not Specified)         Medications:  Outpatient Medications Marked as Taking for the 3/18/21 encounter (Office Visit) with Darnell Silva NP   Medication Sig Dispense Refill    phentermine (ADIPEX-P) 37.5 mg tablet Take 1/2 daily am for one week, if increased suppression needed inc to 1 tab daily, do not take after noon to avoid insomnia 30 Tab 0    hydrOXYchloroQUINE (PLAQUENIL) 200 mg tablet       losartan (COZAAR) 25 mg tablet       Xarelto 20 mg tab tablet       predniSONE (DELTASONE) 10 mg tablet Take 8 mg by mouth daily (with breakfast).  mycophenolate (CELLCEPT) 500 mg tablet Take 500 mg by mouth two (2) times a day. Review of Systems  Review of Systems   Constitutional: Positive for weight loss. All other systems reviewed and are negative. Objective  Visit Vitals  /88   Pulse 77   Temp 97.6 °F (36.4 °C)   Resp 18   Ht 4' 11\" (1.499 m)   Wt 79.8 kg (176 lb)   SpO2 98%   BMI 35.55 kg/m²     No LMP recorded. (Menstrual status: Chemically Induced). Physical Exam  Physical Exam  Vitals signs and nursing note reviewed. Constitutional:       Appearance: She is well-developed. She is obese. HENT:      Head: Normocephalic and atraumatic. Eyes:      Pupils: Pupils are equal, round, and reactive to light. Cardiovascular:      Rate and Rhythm: Normal rate. Heart sounds: Normal heart sounds. Pulmonary:      Effort: Pulmonary effort is normal.      Breath sounds: Normal breath sounds. Musculoskeletal: Normal range of motion. Skin:     General: Skin is warm and dry. Neurological:      Mental Status: She is alert and oriented to person, place, and time. Psychiatric:         Mood and Affect: Mood normal.         Behavior: Behavior normal.           No results found for this or any previous visit (from the past 672 hour(s)). Assessment / Plan    Encounter Diagnoses   Name Primary?  BMI 37.0-37.9, adult     Weight loss counseling, encounter for     Dietary counseling and surveillance     Obesity (BMI 30-39.9)            1. Weight management      Today, the patient is  Height: 4' 11\" (149.9 cm) tall, Weight: 79.8 kg (176 lb) lbs for a Body mass index is 35.55 kg/m². is suffering from obesity  which is further complicated by hypertension. Degree of control: doing great, LCD, break through cravings will support with appetite suppressant    Progress was reviewed with patient. Goal(s) for next appointment:   -10lbs     I have reviewed/discussed the above normal BMI with the patient. I have recommended the following interventions: dietary management education, guidance, and counseling, encourage exercise, monitor weight and prescribed dietary intake . Augusto Fay Pt desires start phentermine--reviewed potential SE including: elevated HR, BP, increased anxiety, insomnia, constipation  Pt to start with 37.5mg tablet--1/2 tab early am--add 2nd half after 1st week if needs increased suppression, but no later than noon to avoid insomnia. Gave 30 tabs, no RF--pt understands will need monthly visits      2. Labs    Latest results reviewed with patient   Routine monitoring labs ordered  Orders Placed This Encounter    phentermine (ADIPEX-P) 37.5 mg tablet       JARETT Lawrence     Dragon medical dictation software was used for portions of this report. Unintended transcription errors may occur.

## 2021-03-18 NOTE — PROGRESS NOTES
Zoraida Boyd is a 26 y.o. female    Chief Complaint   Patient presents with    Weight Management     mwzenia   . Star Sanders Nursing Note for Medical Monitoring in the [facility]      Zoraida Boyd is a 26 y.o. female who is enrolled in [facility]    Zoraida Boyd was prescribed the VLCD / LCD. Did you have any problems adhering to the program last week? no  If yes, please explain:     Since your last visit, have you experienced any complications? no    Have you received any other medical care this week? no  If yes, where and for what? Have you had any change in your medications since your last visit? no  If yes what? Are you taking an appetite suppressant? yes   If yes:  Do you need a refill? BP Readings from Last 3 Encounters:   02/24/21 (!) 149/109   02/18/21 (!) 150/80   12/11/20 (!) 146/91        Eating Habits Over Last Week:  Did you take in 64 oz of non-caloric fluids?  yes     Did you consume your prescribed meal replacement regimen each day? no       Physical Activity Over the Past Week:    Aerobic exercise:100 min  Resistance exercise: 5 workouts / week

## 2021-04-27 ENCOUNTER — OFFICE VISIT (OUTPATIENT)
Dept: SURGERY | Age: 31
End: 2021-04-27
Payer: COMMERCIAL

## 2021-04-27 DIAGNOSIS — Z71.3 DIETARY COUNSELING AND SURVEILLANCE: ICD-10-CM

## 2021-04-27 DIAGNOSIS — Z71.3 WEIGHT LOSS COUNSELING, ENCOUNTER FOR: ICD-10-CM

## 2021-04-27 DIAGNOSIS — E66.9 OBESITY (BMI 30-39.9): ICD-10-CM

## 2021-04-27 PROCEDURE — 99213 OFFICE O/P EST LOW 20 MIN: CPT | Performed by: NURSE PRACTITIONER

## 2021-04-27 RX ORDER — PHENTERMINE HYDROCHLORIDE 37.5 MG/1
TABLET ORAL
Qty: 30 TAB | Refills: 0 | Status: SHIPPED | OUTPATIENT
Start: 2021-04-27 | End: 2021-06-01 | Stop reason: DRUGHIGH

## 2021-04-27 NOTE — PROGRESS NOTES
[facility] Progress Note:   F/up Provider Visit    CC: Weight Management    Tamra Hermosillo is a 26 y.o. female who is here for her  f/up medical provider visit for the VLCD/ LCD Program. she did attend class last week. Had Stewart was rx'd Steroids, much improved now, requesting to restart phentermine and weight loss. Stopped phentermine during COVID / for the past four weeks.     -4lbs     Weight History  Weight Metrics 4/27/2021 4/27/2021 3/18/2021 3/10/2021 3/2/2021 2/24/2021 2/18/2021   Weight - 172 lb 176 lb 177 lb 180 lb 183 lb 186 lb   Waist Measure Inches 34 - - - - - -   Body Fat % 38.6 - - - - - -   BMI - 34.74 kg/m2 35.55 kg/m2 35.75 kg/m2 36.36 kg/m2 36.96 kg/m2 37.57 kg/m2       Starting wt: 196  Goal wt: 150    Ideal body weight: 48.8 kg (107 lb 8.4 oz)  Adjusted ideal body weight: 60.5 kg (133 lb 5 oz)  Body mass index is 34.74 kg/m². History    Past Medical History:   Diagnosis Date    Cancer ([facility] [address] JAN 2015    CERVICAL--HAD CHEMO & RADIATION    Hypertension     Lupus ([facility] [address]     Nephritis     [facility]     [facility]       Past Surgical History:   Procedure Laterality Date    HX GYN      Brachy therapy    HX OTHER SURGICAL      fatty tissue from abdomen removed    [place] EXC TUMOR SOFT TISSUE ABDOMINAL WALL SUBQ 3+CM  12/16/2015    Dr. Hugo Ramos  Left and Right side abdomen       Current Outpatient Medications   Medication Sig Dispense Refill    hydrOXYchloroQUINE (PLAQUENIL) 200 mg tablet       losartan (COZAAR) 25 mg tablet       Xarelto 20 mg tab tablet       predniSONE (DELTASONE) 10 mg tablet Take 8 mg by mouth daily (with breakfast).  mycophenolate (CELLCEPT) 500 mg tablet Take 500 mg by mouth two (2) times a day.       phentermine (ADIPEX-P) 37.5 mg tablet Take 1/2 daily am for one week, if increased suppression needed inc to 1 tab daily, do not take after noon to avoid insomnia 30 Tab 0       Allergies   Allergen Reactions    Flagyl [Metronidazole] Nausea Only    Keflex [Cephalexin] Rash    Macrobid [Nitrofurantoin Monohyd/M-Cryst] Nausea Only    Vancomycin Nausea Only       Social History     Tobacco Use    Smoking status: Never Smoker    Smokeless tobacco: Never Used   Substance Use Topics    Alcohol use: Not Currently     Alcohol/week: 0.0 standard drinks     Comment: 1x per month    Drug use: No       Family History   Problem Relation Age of Onset    Hypertension Father        Family Status   Relation Name Status    Father  (Not Specified)         Medications:  Outpatient Medications Marked as Taking for the 4/27/21 encounter (Office Visit) with Patricia Sanchez NP   Medication Sig Dispense Refill    hydrOXYchloroQUINE (PLAQUENIL) 200 mg tablet       losartan (COZAAR) 25 mg tablet       Xarelto 20 mg tab tablet       predniSONE (DELTASONE) 10 mg tablet Take 8 mg by mouth daily (with breakfast).  mycophenolate (CELLCEPT) 500 mg tablet Take 500 mg by mouth two (2) times a day. Review of Systems  Review of Systems   Constitutional: Positive for weight loss. All other systems reviewed and are negative. Objective  Visit Vitals  BP (!) 139/109 (BP 1 Location: Left upper arm, BP Patient Position: At rest, BP Cuff Size: Adult)   Pulse 88   Temp 98.6 °F (37 °C) (Oral)   Resp 20   Ht 4' 11\" (1.499 m)   Wt 78 kg (172 lb)   BMI 34.74 kg/m²     No LMP recorded. (Menstrual status: Chemically Induced). Physical Exam  Physical Exam  Vitals signs and nursing note reviewed. Constitutional:       Appearance: She is well-developed. She is obese. HENT:      Head: Normocephalic and atraumatic. Eyes:      Pupils: Pupils are equal, round, and reactive to light. Cardiovascular:      Rate and Rhythm: Normal rate. Heart sounds: Normal heart sounds. Pulmonary:      Effort: Pulmonary effort is normal.      Breath sounds: Normal breath sounds. Musculoskeletal: Normal range of motion.    Skin:     General: Skin is warm and dry. Neurological:      Mental Status: She is alert and oriented to person, place, and time. Psychiatric:         Mood and Affect: Mood normal.         Behavior: Behavior normal.           No results found for this or any previous visit (from the past 672 hour(s)). Assessment / Plan    No diagnosis found. 1.  Weight management   Today, the patient is  Height: 4' 11\" (149.9 cm) tall, Weight: 78 kg (172 lb) lbs for a Body mass index is 34.74 kg/m². is suffering from obesity  which is further complicated by hypertension. Degree of control: doing great, LCD, break through cravings will support with appetite suppressant    Progress was reviewed with patient. Goal(s) for next appointment:   -10lbs     I have reviewed/discussed the above normal BMI with the patient. I have recommended the following interventions: dietary management education, guidance, and counseling, encourage exercise, monitor weight and prescribed dietary intake . Christy Patrick Pt desires start phentermine--reviewed potential SE including: elevated HR, BP, increased anxiety, insomnia, constipation  Pt to start with 37.5mg tablet--1/2 tab early am--add 2nd half after 1st week if needs increased suppression, but no later than noon to avoid insomnia. Gave 30 tabs, no RF--pt understands will need monthly visits    BP recheck 129/89     2. Labs    Latest results reviewed with patient    JARETT Wolfe     Dragon medical dictation software was used for portions of this report. Unintended transcription errors may occur.

## 2021-05-10 VITALS
HEART RATE: 88 BPM | DIASTOLIC BLOOD PRESSURE: 89 MMHG | SYSTOLIC BLOOD PRESSURE: 129 MMHG | RESPIRATION RATE: 20 BRPM | BODY MASS INDEX: 34.68 KG/M2 | TEMPERATURE: 98.6 F | WEIGHT: 172 LBS | HEIGHT: 59 IN

## 2021-06-01 ENCOUNTER — OFFICE VISIT (OUTPATIENT)
Dept: SURGERY | Age: 31
End: 2021-06-01
Payer: COMMERCIAL

## 2021-06-01 VITALS
DIASTOLIC BLOOD PRESSURE: 82 MMHG | SYSTOLIC BLOOD PRESSURE: 139 MMHG | HEART RATE: 98 BPM | HEIGHT: 59 IN | WEIGHT: 175.1 LBS | OXYGEN SATURATION: 95 % | BODY MASS INDEX: 35.3 KG/M2

## 2021-06-01 DIAGNOSIS — Z71.3 WEIGHT LOSS COUNSELING, ENCOUNTER FOR: ICD-10-CM

## 2021-06-01 DIAGNOSIS — Z71.3 ENCOUNTER FOR WEIGHT LOSS COUNSELING: ICD-10-CM

## 2021-06-01 DIAGNOSIS — E66.9 OBESITY (BMI 30-39.9): ICD-10-CM

## 2021-06-01 DIAGNOSIS — Z71.3 DIETARY COUNSELING AND SURVEILLANCE: ICD-10-CM

## 2021-06-01 PROCEDURE — 99213 OFFICE O/P EST LOW 20 MIN: CPT | Performed by: NURSE PRACTITIONER

## 2021-06-01 RX ORDER — DIETHYLPROPION HYDROCHLORIDE 75 MG/1
75 TABLET, EXTENDED RELEASE ORAL DAILY
Qty: 30 TABLET | Refills: 0 | Status: SHIPPED | OUTPATIENT
Start: 2021-06-01 | End: 2021-07-15 | Stop reason: ALTCHOICE

## 2021-06-01 NOTE — PROGRESS NOTES
Did you have any problems adhering to the program last week? yes  If yes, please explain: Eating more    Since your last visit, have you experienced any complications? no    Have you received any other medical care this week? no  If yes, where and for what? Have you had any change in your medications since your last visit? no  If yes what? Are you taking an appetite suppressant? yes   If yes: Phentermine  Do you need a refill? Yes    BP Readings from Last 3 Encounters:   06/01/21 (!) 160/103   04/27/21 129/89   03/18/21 138/88        Eating Habits Over Last Week:  Did you take in 64 oz of non-caloric fluids? yes     Did you consume your prescribed meal replacement regimen each day? no       Physical Activity Over the Past Week:    Aerobic exercise: 0 min Patient states has  and exercises a couple time a week.   Resistance exercise: 0 workouts / week

## 2021-06-01 NOTE — PROGRESS NOTES
[facility] Progress Note:   F/up Provider Visit    CC: Weight Management    Kelly Macias is a 26 y.o. female who is here for her  f/up medical provider visit for the [facility]. she did attend class last week. MIL passed, using phentermine daily but cravings creeping, wants to try something different. Weight History  Weight Metrics 6/1/2021 4/27/2021 4/27/2021 3/18/2021 3/10/2021 3/2/2021 2/24/2021   Weight 175 lb 1.6 oz - 172 lb 176 lb 177 lb 180 lb 183 lb   Waist Measure Inches - 34 - - - - -   Body Fat % - 38.6 - - - - -   BMI 35.37 kg/m2 - 34.74 kg/m2 35.55 kg/m2 35.75 kg/m2 36.36 kg/m2 36.96 kg/m2       Ideal body weight: 48.8 kg (107 lb 8.4 oz)  Adjusted ideal body weight: 61 kg (134 lb 8.9 oz)  Body mass index is 35.37 kg/m². History    Past Medical History:   Diagnosis Date    Cancer ([facility] [address] JAN 2015    CERVICAL--HAD CHEMO & RADIATION    Hypertension     Lupus ([facility] [address]     Nephritis     Stroke ([facility] [address]     nov.-2013       Past Surgical History:   Procedure Laterality Date    HX GYN      Brachy therapy    HX OTHER SURGICAL      fatty tissue from abdomen removed    MT EXC TUMOR SOFT TISSUE ABDOMINAL WALL SUBQ 3+CM  12/16/2015    Dr. Davenport Keepers  Left and Right side abdomen       Current Outpatient Medications   Medication Sig Dispense Refill    diethylpropion 75 mg TbER Take 75 mg by mouth daily. Max Daily Amount: 75 mg. 30 Tablet 0    hydrOXYchloroQUINE (PLAQUENIL) 200 mg tablet       losartan (COZAAR) 25 mg tablet       Xarelto 20 mg tab tablet       predniSONE (DELTASONE) 10 mg tablet Take 8 mg by mouth daily (with breakfast).  mycophenolate (CELLCEPT) 500 mg tablet Take 500 mg by mouth two (2) times a day.          Allergies   Allergen Reactions    Flagyl [Metronidazole] Nausea Only    Keflex [Cephalexin] Rash    Macrobid [Nitrofurantoin Monohyd/M-Cryst] Nausea Only    Vancomycin Nausea Only       Social History     Tobacco Use    Smoking status: Never Smoker    Smokeless tobacco: Never Used   Vaping Use    Vaping Use: Never used   Substance Use Topics    Alcohol use: Not Currently     Alcohol/week: 0.0 standard drinks     Comment: 1x per month    Drug use: No       Family History   Problem Relation Age of Onset    Hypertension Father        Family Status   Relation Name Status    Father  (Not Specified)         Medications:  Outpatient Medications Marked as Taking for the 6/1/21 encounter (Office Visit) with Trina Oneill NP   Medication Sig Dispense Refill    diethylpropion 75 mg TbER Take 75 mg by mouth daily. Max Daily Amount: 75 mg. 30 Tablet 0    hydrOXYchloroQUINE (PLAQUENIL) 200 mg tablet       losartan (COZAAR) 25 mg tablet       Xarelto 20 mg tab tablet       predniSONE (DELTASONE) 10 mg tablet Take 8 mg by mouth daily (with breakfast).  mycophenolate (CELLCEPT) 500 mg tablet Take 500 mg by mouth two (2) times a day. Review of Systems  ROS      Objective  Visit Vitals  /82 (BP 1 Location: Right arm, BP Patient Position: Sitting, BP Cuff Size: Large adult)   Pulse 98   Ht 4' 11\" (1.499 m)   Wt 79.4 kg (175 lb 1.6 oz)   SpO2 95%   BMI 35.37 kg/m²     No LMP recorded. (Menstrual status: Chemically Induced). Physical Exam  Physical Exam  Vitals and nursing note reviewed. Constitutional:       Appearance: She is well-developed. She is obese. HENT:      Head: Normocephalic and atraumatic. Eyes:      Pupils: Pupils are equal, round, and reactive to light. Neck:      Vascular: No carotid bruit. Cardiovascular:      Rate and Rhythm: Normal rate. Pulses: Normal pulses. Heart sounds: Normal heart sounds. Pulmonary:      Effort: Pulmonary effort is normal.      Breath sounds: Normal breath sounds. Abdominal:      Palpations: Abdomen is soft. Musculoskeletal:         General: Normal range of motion. Cervical back: Normal range of motion and neck supple. No tenderness.    Skin: General: Skin is warm and dry. Capillary Refill: Capillary refill takes less than 2 seconds. Neurological:      General: No focal deficit present. Mental Status: She is alert and oriented to person, place, and time. Psychiatric:         Mood and Affect: Mood normal.         Behavior: Behavior normal.           No results found for this or any previous visit (from the past 672 hour(s)). Assessment / Plan    Encounter Diagnoses   Name Primary?  BMI 35.0-35.9,adult Yes    Weight loss counseling, encounter for     Dietary counseling and surveillance     Obesity (BMI 30-39. 9)     Encounter for weight loss counseling            1.  Weight management      Today, the patient is  Height: 4' 11\" (149.9 cm) tall, Weight: 79.4 kg (175 lb 1.6 oz) lbs for a Body mass index is 35.37 kg/m². is suffering from obesity    Degree of control: get back on track with LCD, will change to alt appetite suppression    Progress was reviewed with patient. Goal(s) for next appointment:   -10 lbs     I have reviewed/discussed the above normal BMI with the patient. I have recommended the following interventions: dietary management education, guidance, and counseling, encourage exercise, monitor weight and prescribed dietary intake . Maria Teresa Machado 2.  Labs    Latest results reviewed with patient   Routine monitoring labs ordered  Orders Placed This Encounter    diethylpropion 75 mg TbER     Pt desires diethylpropion --reviewed potential SE, risks, benefits. Gave 30 tabs, no RF--pt understands will need monthly visits    JARETT Villatoro     Dragon medical dictation software was used for portions of this report. Unintended transcription errors may occur.

## 2021-06-01 NOTE — PATIENT INSTRUCTIONS
 Monthly goal: -10lbs 3 meal replacements daily with 1 meal of protein and veggies. First one within about 1 hour of waking up to get metabolism started. Don't go more than 6 hours between meals. No more than 1 soup a day, this is too much salt. No more than 1 bar a day, this not enough protein. Recommendations - Consume your 4 daily meal replacements equally spaced over the day. Dont go more than 6 hours between each meal. Breakfast is especially important! 
   
- Get the support of family and friends. - Snack-proof your home. - Have strategies for social situations, meetings that run over or vacation.  
   
- If you fall off the plan; just start right back. Reflect on those days into examples of what to change or avoid next time. Program Compliance We do not expect perfection. However, we do ask for your persistence and your willingness to do the work of growing yourself. You will hit plateaus in your weight loss. You will run into situations that test your will power. You will encounter times when you feel frustrated. How your respond to your slip ups and, the adjustments you make to prevent future slip ups will determine you long-term success. If you find yourself temporarily slipping in the program we will gently nudge you forward and encourage you to do the work of identifying and move beyond your stuck areas. However, if you find yourself wavering in the program for a prolonged time (more than 3 months) we will ask that you take a break from the program. At that time you will have 3 options: 1. Consult with one of our weight loss specialists to explore additional weight loss options. 2. Work with a counselor to do some focused work in order to identify and break the patterns that are holding you back. 3. Refer you out to work one on one with a Registered dietitian Once you, your counselor and/or your weight loss specialist feel that you have moved past those patterns and want to give the program another chance, we will gladly work with you to determine if you're ready to start back in the program. 
   
When returning after a break, if it has been over 3 months you will required to repeat an orientation and, if greater than 6 months, you will be required to repeat an orientation, labs and an EKG. Homework for [employer] 
  
  
Exercise:  
- Daily starting slow, gradually increase your time by 10- 20 % per week - To prevent injury, take a recovery week every 4 weeks (reduce your exercise time and intensity by 1/2 during this time) - Your goal is to work up to 150 min a week; hard enough that you can't whistle or sing. It may take 6 months to work up to this. Common Side Effects 
  
-Constipation 
-Fatigue 
-Hunger 
-Low sodium 
-Hair Loss 1. Constipation: It can be prevented by Drinking at least 8-10 glasses of water a day, fiber, and exercise. If you're prone to constipation: - Take a Stool Softener daily. Example: Dulcolax or Miralax - Eat Plenty of Fiber Get the New Direction products with fiber added Keep your fiber intake to at least 20 grams a day Use SUGAR-FREE products: Fiber One, Benefiber or Metamucil If you get constipated: 1. Start with a Stool Softener (produces a bowel movement in 72 hr) 2.  If you still have constipation after 2 to 3 days, add a Stimulant Laxative to the Stool Softener (results usually in 6-12 hr): 
Examples:  Exlax (1 small square) for up to 4 days, Dulcolax stimulant laxative, or Magnesium citrate pill 500 mg start once a day and increase to 3 times a day if needed. 3. If conditions or symptoms persist contact your provider.  
  
2.  Fatigue, most people experience this: 
More common during the first 2 weeks, associated with your body adjusting to the Ketogenic diet. If symptoms persist contact your provider. 3.  Hunger: More common in the first few days often disappears after body adjusts to the Ketogenic diet. 4.  Low blood levels of sodium, less common: If you develop a headache, feel fatigued, light-headed or dizziness try adding 1/2 cup of bouillon broth every day. If symptoms persist contact your provider. 5.  Hair loss, you may see more than a usual amount of hair in your brush or the shower drain: This can happen with physical stress (surgery, trauma or calorie restriction) or psychological stress. Although is it very concerning, it is often temporary and will resolve within 3 months. Some people notice a benefit from taking a daily dose of Biotin 2,500 mcg or hair skin and nail vits. MEDICAL WEIGHT LOSS PROGRAM 
PATIENT MANUAL Table of Contents: 
 
 
i.  Contact Information Page 3  
ii. Reducing Phase: Provider Visits Page 4  
iii. Food Sales Page 5  
iv. New Direction Very Low Calorie Diet Page 6  
v.  Emergency Food Plan Page 8  
vi. Side Effects Page 9 Very Low Calorie Diet (VLCD) Treatment Plan Requirements All patients enrolled in the [place] [address] Weight Loss Program on the VLCD are required to:  Attend MONTHLY Provider Visits: Depending on medical necessity, follow-ups may be in-person or virtual.  
 Attend WEEKLY Nurse Weigh-Ins: Weigh-ins must be scheduled in advance. [facility] have weigh-in appointments on Tuesdays and Wednesdays from 8:00 AM  4:00 PM.  
 Attend WEEKLY Nutrition Classes: These classes are ZOOM Virtual webinars. Invites will be sent to your email following your consult. Class options are currently Tuesdays at 11:30 AM , Wednesdays at 5:30 PM, or Thursdays at 8:00 AM. You only have to attend 1 per week.  Consume the New Direction Meal Replacement System as prescribed by the supervising weight loss provider.  Please see the following pages for detailed instructions on the VLCD.  Failure to comply with any of these requirements may result in being discharged from the program. Patients are allowed 2 absences from the weekly commitments every 16 weeks. [address] [facility] [place] Robin NP Participating Locations: 
[address] \[facility]\"" [facility] [address], [address] [place], [address] Office: [phone] [address] \[facility]\"" [facility] [address], [place] [address] [facility] Office: [phone] Registered Dietitians: 
Rosa Springer RD, Bariatric Dietitian at [facility] [phone] / [address]  Bariatric Dietitian at [facility] [phone] / [email or web address] 
 
 
: Lluvia Koch Office: [phone] / [email or web address] 
 
 
 
 
 
 
 
Reducing Phase: Monthly Provider Visits:  The [facility] [employer] [facility] Loss Program accepts most insurance plans. While most insurance companies will cover the medical costs required by our program, we suggest that you verify your coverage with your insurance company prior to your initial consult. Verification of benefits is never a guarantee of payment.  If your insurance plan requires a referral and/or pre-authorization it is the patient's responsibility to obtain and bring with you to your visit.  Signed financial consent must be given before provider services can be performed.  Once a patient's insurance is billed then contractual obligations are enforced and cannot be changed to self-pay in order to avoid a higher cost share or deductible.  Self-Pay Rates do not apply if a patient provides insurance information.  Payment is required in full for all food purchases, self-pay rates, and insurance copays. I. Patients with account balances 60 days past due will not be eligible to continue in the weight loss program until outstanding balance is settled. II.  
LATE POLICY   In order to avoid having to reschedule, please plan to arrive 15 minutes before your scheduled appointment in order to allow time to check-in. We ask that you bring a photo ID, insurance card, a list of all your medications. Please be advised that we are required to check your photo ID at each office visit. III. No shows  We strive to have daily availability for weight loss and specialty patients. Remember that you are holding a spot that can be used by other patients. Please be courteous and reschedule 24 hours in advance. Cancellation and/or a NO-SHOW will result in patient dismissal from the program. There will be a 3 month waiting period to reenter the program. If you choose to dis-enroll in the weight loss program, please be sure to contact the  in order to cancel any future appointments. IV. Rescheduling  Monthly Provider follow-ups are required for continuation in the Weight Loss Program. Please be advised that you must attend these monthly follow-ups, not just schedule them for compliance. Rescheduling your provider follow-up more than once will result in dismissal from the weight loss program. It's essential that you are supervised by our weight loss provider monthly. To ensure a convenient appointment time, make sure you schedule your next month's follow-up before leaving the office. V. The Medical Weight Loss provider is to consult only on matters related to your participation in the New Direction program.  Your weight loss provider will not to take on the role of your primary care provider. Reducing Phase: Food Sales  Patients are only eligible to purchase New Direction Meal Replacements under medical supervision. For this reason, we only permit food sales during scheduled appointment times.  Patients are not allowed to share/supply family and friends with the meal replacements. It is essential that one is medically monitored while on this VLCD.  All Food Sales are final.  Note: According to [employer], the New Direction product cannot be returned for credit or refund.  CHECK YOUR BAG BEFORE LEAVING THE FACILITY!!!! ALL TRANSACTIONS ARE FINAL. INCORRECT OR INCOMPLETE ORDERS CANNOT BE CORRECTED ONCE THE ITEMS LEAVE THE FACILITY. - NO EXCEPTIONS.  We accept EXACT cash, VISA, MASTERCARD, DISCOVER.  Payment is expected in full.  Patients are limited to 8 boxes per week. This ensures compliance with your medical monitoring requirements.  There is a minimum 2 box purchase required weekly for continued enrollment. The New Direction Very Low Calorie Diet (VLCD):  
Please note if you are on the Low Calorie Diet (LCD) your daily intake will differ from what is listed below. Daily Intake:  VLCD = 800 calories / under 50 grams of carbohydrates / 80+ grams of protein.  4 meal replacements daily 
 
; 3 liquid meal replacements & 1 protein bar 
OR 
; 4 liquid meal replacements  The Shakes, Soups, and Puddings are all interchangeable. This means that you can have 4 puddings a day or 4 shakes a day. You may mix the flavors and types to your preference. (we do recommend you limit your soup intake to 2 daily or 1 daily if you have blood pressure concerns)  Limit your bar intake to 1 daily. The protein bars do not have sufficient nutrients and calories to sustain your daily requirements.  Try to space out your meals so that you are eating every 3-4 hours. Have breakfast within one hour.  The New Direction Fiber Fulfills (Lemon Iced Tea, Mixed Fruit Drink, and Cherry Gelatin) are NOT meal replacements. They serve only as a fiber supplement and can be counted towards your fluid intake .  All New Direction Products are sold per box. 7 days of New Direction Meal Replacements = 4 boxes  Safe Use of the [employer] In order to safely consume the New Direction product, several specific directions should be followed: 
 Each packet has printed instructions for mixing.  All products should be mixed with water  unless you are using one of the approved New Direction Recipes.  Add ice after the initial mixing if you prefer to ensure you are consuming enough fluid.  You must take the prescribed number of packets per day in order to prevent protein depletion and vitamin, mineral, and electrolyte disturbances.  This will also MAXIMIZE your Weight Loss.  64 ounces of non-caloric fluid must be consumed in addition to your 4 meal replacements.  Suggested fluids are water, decaffeinated black coffee or tea, decaffeinated diet soda, Crystal Lite fruit-flavored drinks, sugar-free jello, and bouillon/broth.  Intake of carbohydrates in these liquids may not exceed 10 grams a day. The principal behind this diet is to remain in Ketosis for rapid weight loss  that means limiting your carbohydrate intake to less than 50 grams each day.  2 fiber supplements are recommended in order to prevent diarrhea and constipation. Examples include but are not limited to, Meal Replacements with added fiber, New Direction Fiber Supplements, Metamucil, Benefiber.  The desire to chew may be helped by chewing sugarless gum or breath mints. In addition, either will help relieve fruity breath caused by ketosis. Be sure to count the carbohydrate content of these items in your carbohydrate total for the day.  Do not consume alcoholic beverages while you are on the New Direction VLCD product.  Should you become pregnant, or suspect that you are pregnant while taking the New Direction product, please notify us immediately.  If an emergency situation leaves you without a supply of product, follow your emergency meal  
 
 
Emergency Food Plan Should you be in a situation for more than 1 day without your New Direction nutritional product, you can maintain your program with an emergency food plan.  
 
For each packet of New Direction nutritional product, you may substitute: 
 
3 ounces of lean meat, fish or poultry AND 
3 Sulligent toast rectangles or soda crackers The Foods The lean meat, fish, or poultry should be prepared without fat or salt. Remove the skin and visible fat from poultry. Lean meats should be boiled, baked, or broiled rather than fried. Eight rounds of Sulligent toast are equivalent to three rectangles. The Food Plan Divide the food allowance into three portions and space the servings just as you would the New Direction Nutritional Product; as meal replacements for breakfast, lunch and dinner. You should continue to drink eight glasses of water during the day. Supplements When you are without New Direction nutritional products for more than 2 days, supplement your daily food intake with a multivitamin and mineral tablet to ensure that you receive the [place] RDA of vitamins and minerals needed for good health. Any longer emergency period may require daily supplements of potassium (40 mEq or 1560 mg), calcium (800 mg), magnesium (400 ma) and sodium (1 teaspoon salt) to replace the losses caused by the mild diuretic effect of this high protein diet. Contact your [address] for advice about the supplement to use in an emergency situation. RETURN TO THE NEW DIRECTION NUTRITIONAL PRODUCTS AS SOON AS POSSIBLE. ITS NUTRIENT CONTENT IS CAREFULLY DESIGNED TO MEET YOUR NUTRITIONAL NEEDS WHILE YOU ARE LOSING WEIGHT. Side Effects From time to time, you may experience side effects while taking the New Direction VLCD product. These side effects are usually mild and temporary as your body adjusts to a new diet regimen. Any persistent effects should be reported to the [facility] nurse or provider. The most common side effects are: Hypotension (Low Blood Pressure) At the beginning of the very low-calorie diet, there is increased water loss in urine, resulting in lower blood volume and lower blood pressure.  If you move quickly from a reclining to a standing or a sitting position, blood may not reach the brain for a second or two. Reduced blood supply to the brain makes you feel dizzy, feel light-headed, or see black spots before your eyes. Drink plenty of water and other calorie-free liquids to keep your blood volume high. Take all prescribed packets of the New Direction VLCD product since carbohydrate and sodium in the formulation help prevent this situation. If you perspire heavily, or are out in hot weather, it may be advisable to take 1-2 servings of regular bouillon to replace sodium and raise blood volume. If you have a history of high blood pressure, see the nurse before using regular bouillon. Note: Because of low blood volume, do not give blood while in the Reducing and Adapting Phases of New Direction VLCD. Fruity Breath When you are in ketosis, you will exhale acetone as you breathe. Acetone has a fruity odor. You may want to use mouthwash, sugar-free breath mints, or sugar-free chewing gum. Fatigue Euphoria For most clients, fatigue is not a problem. Delay beginning a new program of physical activity for a week or two if you experience fatigue. Many clients report increase energy levels after the first week or two. Headache You may experience a headache during the first few days on the New Direction VLCD. This may be caused by reducing caffeine too rapidly or by a shift in body fluid or blood sugar. Contact us for guidelines to follow. You may use Tylenol or other acetaminophen-containing products, but do not use aspirin. Muscle Cramps Be sure to take all prescribed packets of product in order to meet your nutrient requirements. If muscle cramps are troublesome, report them to the staff. Foot Drop A very rare and relatively temporary problem when is associated with weight loss is foot drop or numbness in the lower part of the leg resulting from the habit of crossing your legs at the knees and compressing a nerve at the back of the knee. This nerve becomes more exposed as you lose fat and is more sensitive to pressure. To prevent this situation, avoid crossing your legs. Constipation or Diarrhea Diarrhea can result in excess fluid and mineral losses and should be reported to the New Direction provider. Constipation is defined as hard stool or reduced volume and/or frequency of bowel movements. If you experience any abnormal bowel movements, talk with the New Direction provider or nurse. You are strongly advised to begin a fiber supplement when you begin the New Direction VLCD. This will help to minimize or alleviate abnormal bowel movements. A listing of over-the-counter products acceptable for treating these effects is included in the program module, Your Introduction to the [address]. Other products include SUGAR FREE CITRUCEL OR SUGAR FREE METAMUCIL. Dry Skin, Cold Intolerance, Brittle Hair, Temporary Thinning of the Hair These effects are usually associated with lower levels of thyroid hormone. This lowered level is the normal adjustment the body makes to a lowered caloric intake. Not everyone experiences these effects and they are temporary. After fasting, your caloric intake increases and your thyroid level returns to a normal level and these effects reverse. Menstrual Irregularities Tests for pregnancy should be done if a menstrual period is missed or late, since pregnancy is a contraindication for continuing on a very low-calorie diet. Temporary Rise in Liver Enzymes and Hyperuricemia These side effects are detected by laboratory tests and will be monitored throughout the  
Reducing Phase. Anemia Hemoglobin and hematocrit tests will be evaluated during screening and will be monitored if necessary during the Reducing Phase. Cardiac Arrhythmias (abnormal heart rhythms) An EKG will be done initially and after every 30-50 pounds of weight loss to determine if any abnormality has occurred.  The risk of cardiac arrhythmias will be minimized by taking all of the [facility] product prescribed for you, to ensure an adequate intake of protein and electrolytes. Gallbladder Symptoms and Gallstones Studies have shown there is an increased risk of gall bladder symptoms and gallstones during weight loss. For clients with a history of gall bladder symptoms, weight loss may cause symptoms to recur. Any pain in your abdomen should be reported to the [facility] provider or nurse. Emergency Medical Procedure Should you need to speak with the nurse or provider during [facility] office hours, please call your weight loss provider during business hours. Should an emergency arise during the evening or weekend, please call your family provider and/or go to the Emergency Room. If this situation occurs, call us the next morning. Medicines The requirement for certain medications may be decreased while you are on the [facility] VLCD. Be sure to list all your medications on your medical history. The [facility] provider will review your medications and will give you specific instructions for any changes. Be sure to report any new medication prescribed for you by your primary provider as you go through the program.  Any questions regarding your current medications and needs for adjustment should be directed to the [facility] provider. This Section Will Be Completed  By Medical Staff At Your Weekly Class Ht _______  Wt _____________ Boni Polite wt gain? BP _________/________ HR ________ Waist ___________ LMP ____________ HOMEWORK  BRING COMPLETED TO MEDICAL MONITORING APPT. Week in Review Patient Name: __________________________Birthdate:________________ Today's Date: _________________ #Weeks enrolled in Reducing Phase: ____________ Did you have any symptoms of physical problems? Yes _____ No _____ If yes, [place] and comment: weakness, fatigue, lightheadedness, 
headache, cramps, cold intolerance, hair loss, diarrhea, constipation or other:  
            
             
Have you  received any other medical care this week? Yes _____ No ______ If yes, where and why?       ______ Did you have any changes in medications this week? (Include potassium and magnesium) Please list all:            
Did you have any problems adhering to the fast?   Yes _____ No _____ If yes, describe situation: How much exercise do you plan to do next week? _________________________ I am attending the virtual nutrtion education classes weekly: Duane Raspberry  /  [facility] VLCD: Weekly Activities Record Mon. Tues. Wed. Thurs. Fri. Sat. Sun. Weekly Total  
# of New Direction products consumed Ounces of water Ounces of other drinks, consumed. List any food you ate outside of the program          
Length/Type of physical activity  
(30 min/running) Cardio Strength Cardio Strength Cardio Strength Cardio Strength Cardio Strength Cardio Strength Cardio Strength Cardio Strength Extracurricular activities (shopping, calling a friend, etc.) My mood today  
(e.g., tired, content, anxious, lonely, etc.)

## 2021-07-15 ENCOUNTER — OFFICE VISIT (OUTPATIENT)
Dept: SURGERY | Age: 31
End: 2021-07-15
Payer: COMMERCIAL

## 2021-07-15 VITALS
HEIGHT: 59 IN | SYSTOLIC BLOOD PRESSURE: 147 MMHG | TEMPERATURE: 97.5 F | WEIGHT: 168 LBS | DIASTOLIC BLOOD PRESSURE: 86 MMHG | OXYGEN SATURATION: 96 % | BODY MASS INDEX: 33.87 KG/M2 | HEART RATE: 79 BPM

## 2021-07-15 DIAGNOSIS — Z71.3 DIETARY COUNSELING AND SURVEILLANCE: ICD-10-CM

## 2021-07-15 DIAGNOSIS — Z71.3 ENCOUNTER FOR WEIGHT LOSS COUNSELING: ICD-10-CM

## 2021-07-15 DIAGNOSIS — E66.9 OBESITY (BMI 30-39.9): Primary | ICD-10-CM

## 2021-07-15 PROCEDURE — 99212 OFFICE O/P EST SF 10 MIN: CPT | Performed by: NURSE PRACTITIONER

## 2021-07-15 RX ORDER — PHENTERMINE HYDROCHLORIDE 37.5 MG/1
37.5 TABLET ORAL AS NEEDED
Qty: 15 TABLET | Refills: 0 | Status: SHIPPED | OUTPATIENT
Start: 2021-07-15 | End: 2021-10-15 | Stop reason: SDUPTHER

## 2021-07-15 NOTE — PROGRESS NOTES
[facility] Progress Note:   F/up Provider Visit      Ashlee Dempsey is a 33 y.o. female who is here for her  f/up medical provider visit for the LCD  Program. she did not attend class last week. Did you have any problems adhering to the program last week? no  If yes, please explain:     Since your last visit, have you experienced any complications? no    Have you received any other medical care this week? no  If yes, where and for what? Have you had any change in your medications since your last visit? no  If yes what?   -7 lbs  Went to [place]  1 meal replacement in mornings. Working out with a . Are you taking an appetite suppressant? yes   If yes: phentermine 37.5 mg daily. Took diethylpropion for a few days, but still had breakthrough cravings. Went back to phentermine 37.5 mg daily. Desires to continue, denies SE such as HA, palpitations, insomnia. Do you need a refill? yes    BP Readings from Last 3 Encounters:   07/15/21 (!) 147/86   06/01/21 139/82   04/27/21 129/89        Eating Habits Over Last Week:  Did you take in 64 oz of non-caloric fluids? yes     Did you consume your prescribed meal replacement regimen each day? no       Physical Activity Over the Past Week:    Aerobic exercise: 240 min  Resistance exercise:0 workouts / week      Weight History  Weight Metrics 7/15/2021 7/15/2021 6/1/2021 4/27/2021 4/27/2021 3/18/2021 3/10/2021   Weight - 168 lb 175 lb 1.6 oz - 172 lb 176 lb 177 lb   Waist Measure Inches 34 - - 34 - - -   Body Fat % - - - 38.6 - - -   BMI - 33.93 kg/m2 35.37 kg/m2 - 34.74 kg/m2 35.55 kg/m2 35.75 kg/m2         Ideal body weight: 48.8 kg (107 lb 8.4 oz)  Adjusted ideal body weight: 59.7 kg (131 lb 11.4 oz)  Body mass index is 33.93 kg/m².       History    Past Medical History:   Diagnosis Date    Cancer ([facility] JAN 2015    CERVICAL--HAD CHEMO & RADIATION    Hypertension     Lupus ([facility]     Nephritis     Stroke ([facility]     nov.-2013 Past Surgical History:   Procedure Laterality Date    HX GYN      Brachy therapy    HX OTHER SURGICAL      fatty tissue from abdomen removed    [place] EXC TUMOR SOFT TISSUE ABDOMINAL WALL SUBQ 3+CM  12/16/2015    Dr. Sarthak Chacon  Left and Right side abdomen       Current Outpatient Medications   Medication Sig Dispense Refill    phentermine (ADIPEX-P) 37.5 mg tablet Take 1 Tablet by mouth as needed (for appetite supression). Max Daily Amount: 3,600 mg. 15 Tablet 0    hydrOXYchloroQUINE (PLAQUENIL) 200 mg tablet       losartan (COZAAR) 25 mg tablet       Xarelto 20 mg tab tablet       predniSONE (DELTASONE) 10 mg tablet Take 8 mg by mouth daily (with breakfast).  mycophenolate (CELLCEPT) 500 mg tablet Take 500 mg by mouth two (2) times a day. Allergies   Allergen Reactions    Flagyl [Metronidazole] Nausea Only    Keflex [Cephalexin] Rash    Macrobid [Nitrofurantoin Monohyd/M-Cryst] Nausea Only    Vancomycin Nausea Only       Social History     Tobacco Use    Smoking status: Never Smoker    Smokeless tobacco: Never Used   Vaping Use    Vaping Use: Never used   Substance Use Topics    Alcohol use: Not Currently     Alcohol/week: 0.0 standard drinks     Comment: 1x per month    Drug use: No       Family History   Problem Relation Age of Onset    Hypertension Father        Family Status   Relation Name Status    Father  (Not Specified)         Medications:  Outpatient Medications Marked as Taking for the 7/15/21 encounter (Office Visit) with Tish Sims NP   Medication Sig Dispense Refill    phentermine (ADIPEX-P) 37.5 mg tablet Take 1 Tablet by mouth as needed (for appetite supression). Max Daily Amount: 3,600 mg. 15 Tablet 0    hydrOXYchloroQUINE (PLAQUENIL) 200 mg tablet       losartan (COZAAR) 25 mg tablet       Xarelto 20 mg tab tablet       predniSONE (DELTASONE) 10 mg tablet Take 8 mg by mouth daily (with breakfast).       mycophenolate (CELLCEPT) 500 mg tablet Take 500 mg by mouth two (2) times a day. Review of Systems  Review of Systems   Constitutional: Positive for weight loss. Negative for malaise/fatigue. Eyes: Negative. Respiratory: Negative. Cardiovascular: Negative for chest pain and palpitations. Gastrointestinal: Positive for diarrhea. Genitourinary: Negative. Skin: Negative. Neurological: Negative. Objective  Visit Vitals  BP (!) 147/86 (BP 1 Location: Right arm, BP Patient Position: Sitting)   Pulse 79   Temp 97.5 °F (36.4 °C)   Ht 4' 11\" (1.499 m)   Wt 76.2 kg (168 lb)   SpO2 96%   BMI 33.93 kg/m²     No LMP recorded. (Menstrual status: Chemically Induced). Physical Exam  Physical Exam  Constitutional:       Appearance: She is obese. HENT:      Head: Normocephalic and atraumatic. Cardiovascular:      Rate and Rhythm: Normal rate and regular rhythm. Pulses: Normal pulses. Heart sounds: Normal heart sounds. Pulmonary:      Effort: Pulmonary effort is normal.      Breath sounds: Normal breath sounds. Abdominal:      Palpations: Abdomen is soft. Skin:     General: Skin is warm and dry. Neurological:      General: No focal deficit present. Mental Status: She is alert and oriented to person, place, and time. Psychiatric:         Mood and Affect: Mood normal.           No results found for this or any previous visit (from the past 672 hour(s)). Assessment / Plan    Encounter Diagnoses   Name Primary?  Obesity (BMI 30-39. 9) Yes    BMI 33.0-33.9,adult     Encounter for weight loss counseling     Dietary counseling and surveillance        1. Weight management      Today, the patient is  Height: 4' 11\" (149.9 cm) tall, Weight: 76.2 kg (168 lb) lbs for a Body mass index is 33.93 kg/m². is suffering from obesity  which is further complicated by hypertension and lupus. Elevated BP in office, after sitting for 15 min, diastolic <76. Elevated BP since before starting on phentermine.  She will discuss this with her nephrologist at next visit since they put her on losartan. Degree of control: good   Progress was reviewed with patient. Goal(s) for next appointment:   -4 lbs    She desires to continue phentermine. Down 4% in body weight since last visit. Will continue with phentermine but on prn basis. Reviewed potential SE including: elevated HR, BP, increased anxiety, insomnia, constipation. Follow up in one month  PDMP reviewed. Orders Placed This Encounter    phentermine (ADIPEX-P) 37.5 mg tablet         The primary encounter diagnosis was Obesity (BMI 30-39.9). Diagnoses of BMI 33.0-33.9,adult, Encounter for weight loss counseling, and Dietary counseling and surveillance were also pertinent to this visit. Follow-up and Dispositions    · Return in about 4 weeks (around 8/12/2021), or if symptoms worsen or fail to improve, for medication follow up. Routing History        A total time of 20 minutes was spent face-to-face with the patient during this encounter and over half of that time was spent on counseling.     Jose Alfredo Kaiser NP

## 2021-10-15 ENCOUNTER — TELEPHONE (OUTPATIENT)
Dept: SURGERY | Age: 31
End: 2021-10-15

## 2021-10-15 DIAGNOSIS — E66.9 OBESITY (BMI 30-39.9): ICD-10-CM

## 2021-10-15 DIAGNOSIS — Z71.3 DIETARY COUNSELING AND SURVEILLANCE: ICD-10-CM

## 2021-10-15 DIAGNOSIS — Z71.3 ENCOUNTER FOR WEIGHT LOSS COUNSELING: ICD-10-CM

## 2021-10-15 RX ORDER — PHENTERMINE HYDROCHLORIDE 37.5 MG/1
37.5 TABLET ORAL AS NEEDED
Qty: 5 TABLET | Refills: 0 | Status: SHIPPED | OUTPATIENT
Start: 2021-10-15 | End: 2021-10-19 | Stop reason: ALTCHOICE

## 2021-10-15 NOTE — TELEPHONE ENCOUNTER
Patient called to request refill of phentermine. She has a follow up on 11/2/21 but will run out prior. She would like the script sent to:  [facility] [address] AT [facility] and patient's contact information.     Will notify NP.

## 2021-10-19 ENCOUNTER — OFFICE VISIT (OUTPATIENT)
Dept: SURGERY | Age: 31
End: 2021-10-19
Payer: COMMERCIAL

## 2021-10-19 VITALS
OXYGEN SATURATION: 97 % | WEIGHT: 168 LBS | SYSTOLIC BLOOD PRESSURE: 147 MMHG | HEIGHT: 59 IN | DIASTOLIC BLOOD PRESSURE: 116 MMHG | BODY MASS INDEX: 33.87 KG/M2 | TEMPERATURE: 98 F | HEART RATE: 90 BPM

## 2021-10-19 DIAGNOSIS — M32.9 LUPUS (HCC): ICD-10-CM

## 2021-10-19 DIAGNOSIS — I10 ESSENTIAL HYPERTENSION: ICD-10-CM

## 2021-10-19 DIAGNOSIS — Z71.3 DIETARY COUNSELING AND SURVEILLANCE: Primary | ICD-10-CM

## 2021-10-19 DIAGNOSIS — E66.9 OBESITY (BMI 30-39.9): ICD-10-CM

## 2021-10-19 DIAGNOSIS — Z71.3 ENCOUNTER FOR WEIGHT LOSS COUNSELING: ICD-10-CM

## 2021-10-19 PROCEDURE — 99213 OFFICE O/P EST LOW 20 MIN: CPT | Performed by: NURSE PRACTITIONER

## 2021-10-19 RX ORDER — PEN NEEDLE, DIABETIC 32 GX 1/6"
1 NEEDLE, DISPOSABLE MISCELLANEOUS DAILY
Qty: 100 PEN NEEDLE | Refills: 1 | Status: SHIPPED | OUTPATIENT
Start: 2021-10-19 | End: 2022-01-27

## 2021-10-19 NOTE — PROGRESS NOTES
[facility] Progress Note:   F/up Provider Visit    CC: Weight Management    Milan Nguyen is a 33 y.o. female who is here for her  f/up medical provider visit for the LCD Program. she did not attend class last week. -0 lbs  Started meal prepping again. Membership at gym    Did you have any problems adhering to the program last week? no  If yes, please explain:     Since your last visit, have you experienced any complications? no    Have you received any other medical care this week? no  If yes, where and for what? Have you had any change in your medications since your last visit? no  If yes what? Are you taking an appetite suppressant? yes -phentermine prn  If yes:  Do you need a refill? yes    BP Readings from Last 3 Encounters:   10/19/21 (!) 147/116   07/15/21 (!) 147/86   06/01/21 139/82        Eating Habits Over Last Week:  Did you take in 64 oz of non-caloric fluids? yes     Did you consume your prescribed meal replacement regimen each day? no       Physical Activity Over the Past Week:    Aerobic exercise:  min  Resistance exercise:  workouts / week      Weight History  Weight Metrics 10/19/2021 10/19/2021 7/15/2021 7/15/2021 6/1/2021 4/27/2021 4/27/2021   Weight - 168 lb - 168 lb 175 lb 1.6 oz - 172 lb   Waist Measure Inches 34 - 34 - - 34 -   Body Fat % - - - - - 38.6 -   BMI - 33.93 kg/m2 - 33.93 kg/m2 35.37 kg/m2 - 34.74 kg/m2       Ideal body weight: 48.8 kg (107 lb 8.4 oz)  Adjusted ideal body weight: 59.7 kg (131 lb 11.4 oz)  Body mass index is 33.93 kg/m².       History    Past Medical History:   Diagnosis Date    Cancer ([facility] JAN 2015    CERVICAL--HAD CHEMO & RADIATION    Hypertension     Lupus ([facility]     Nephritis     [facility]     [facility]       Past Surgical History:   Procedure Laterality Date    HX GYN      Brachy therapy    HX OTHER SURGICAL      fatty tissue from abdomen removed    [place] EXC TUMOR SOFT TISSUE ABDOMINAL WALL SUBQ 3+CM  12/16/2015 Dr. Myna Phoenix  Left and Right side abdomen       Current Outpatient Medications   Medication Sig Dispense Refill    liraglutide, weight loss, (SAXENDA) 3 mg/0.5 mL (18 mg/3 mL) pen Week 1: 0.6 mg SC once daily x 7 days, Week 2: 1.2 mg SC once daily x 7 days, Week 3: 1.8 mg SC once daily x 7 days, Week 4: 2.4 mg SC once daily x 7 days, Week 5 and on: 3.0 mg SC once daily 5 Adjustable Dose Pre-filled Pen Syringe 1    pen needle, diabetic (NovoFine Plus) 32 gauge x 1/6\" ndle 1 Dose by Does Not Apply route daily. 100 Pen Needle 1    hydrOXYchloroQUINE (PLAQUENIL) 200 mg tablet       losartan (COZAAR) 25 mg tablet       Xarelto 20 mg tab tablet       predniSONE (DELTASONE) 10 mg tablet Take 8 mg by mouth daily (with breakfast).  mycophenolate (CELLCEPT) 500 mg tablet Take 500 mg by mouth two (2) times a day. Allergies   Allergen Reactions    Flagyl [Metronidazole] Nausea Only    Keflex [Cephalexin] Rash    Macrobid [Nitrofurantoin Monohyd/M-Cryst] Nausea Only    Vancomycin Nausea Only       Social History     Tobacco Use    Smoking status: Never Smoker    Smokeless tobacco: Never Used   Vaping Use    Vaping Use: Never used   Substance Use Topics    Alcohol use:  Yes     Alcohol/week: 0.0 standard drinks     Comment: 1x per month    Drug use: No       Family History   Problem Relation Age of Onset    Hypertension Father        Family Status   Relation Name Status    Father  (Not Specified)         Medications:  Outpatient Medications Marked as Taking for the 10/19/21 encounter (Office Visit) with Erika Lazo NP   Medication Sig Dispense Refill    liraglutide, weight loss, (SAXENDA) 3 mg/0.5 mL (18 mg/3 mL) pen Week 1: 0.6 mg SC once daily x 7 days, Week 2: 1.2 mg SC once daily x 7 days, Week 3: 1.8 mg SC once daily x 7 days, Week 4: 2.4 mg SC once daily x 7 days, Week 5 and on: 3.0 mg SC once daily 5 Adjustable Dose Pre-filled Pen Syringe 1    pen needle, diabetic (NovoFine Plus) 32 gauge x 1/6\" ndle 1 Dose by Does Not Apply route daily. 100 Pen Needle 1    hydrOXYchloroQUINE (PLAQUENIL) 200 mg tablet       losartan (COZAAR) 25 mg tablet       Xarelto 20 mg tab tablet       predniSONE (DELTASONE) 10 mg tablet Take 8 mg by mouth daily (with breakfast).  mycophenolate (CELLCEPT) 500 mg tablet Take 500 mg by mouth two (2) times a day. Review of Systems  Review of Systems   Constitutional: Negative for malaise/fatigue and weight loss. Eyes: Negative. Cardiovascular: Negative for chest pain, palpitations and leg swelling. Gastrointestinal: Negative. Genitourinary: Negative. Skin: Negative. Neurological: Negative. Objective  Visit Vitals  BP (!) 147/116 (BP 1 Location: Right arm, BP Patient Position: Sitting)   Pulse 90   Temp 98 °F (36.7 °C)   Ht 4' 11\" (1.499 m)   Wt 76.2 kg (168 lb)   SpO2 97%   BMI 33.93 kg/m²     No LMP recorded. (Menstrual status: Chemically Induced). Physical Exam  Physical Exam  Vitals and nursing note reviewed. Constitutional:       Appearance: She is obese. HENT:      Head: Normocephalic and atraumatic. Cardiovascular:      Rate and Rhythm: Normal rate and regular rhythm. Pulses: Normal pulses. Heart sounds: Normal heart sounds. Pulmonary:      Effort: Pulmonary effort is normal.      Breath sounds: Normal breath sounds. Musculoskeletal:         General: Normal range of motion. Skin:     General: Skin is warm and dry. Neurological:      General: No focal deficit present. Mental Status: She is alert and oriented to person, place, and time. Psychiatric:         Mood and Affect: Mood normal.         Behavior: Behavior normal.           No results found for this or any previous visit (from the past 672 hour(s)). Assessment / Plan    Encounter Diagnoses   Name Primary?  Dietary counseling and surveillance Yes    Encounter for weight loss counseling     Obesity (BMI 30-39. 9)     BMI 33.0-33.9,adult     [facility] ([facility]     Essential hypertension        1. Weight management      Today, the patient is  Height: 4' 11\" (149.9 cm) tall, Weight: 76.2 kg (168 lb) lbs for a Body mass index is 33.93 kg/m². is suffering from obesity    Got off track the past couple months but getting back on track this week. Would like to continue phentermine, but will not prescribe due to elevated BP. She also reports she did not take her medication today. Discussed the alternatives for assistance with her chronic obesity   Phentermine (Adipex-P): Contraindicated with HTN    Benzphetamine (Didrex): see phentermine   Diethylpropion (Tenuate): see phentermine   Bupropion-naltrexone (Contrave): not indicated for specific eating behaviors   Phentermine-topiramate (Qysmia): see phentermine    Other (please specify): weight management program with low carb high protein, exercise with . Pt desires to start Saxenda    Chronic weight management [facility]): SubQ: Initial: 0.6 mg once daily for one week; increase by 0.6 mg daily at weekly intervals to a target dose of 3 mg once daily. If the patient cannot tolerate an increased dose during dose escalation, consider delaying dose escalation for one additional week. If the 3 mg daily dose is not tolerated, discontinue use as efficacy has not been established at lower doses. Note: Evaluate change in body weight 16 weeks after initiation of therapy; discontinue if at least 4% of baseline body weight loss has not been achieved. SubQ: Inject subcutaneously in the upper arm, thigh, or abdomen. Do not inject intravenously or intramuscularly. Administer without regard to meals or time of day. Change needle with each administration. Use only if clear, colorless, and free of particulate matter. Do not share pens between patients even if needle is changed. Adverse reactions, contridations, warnings, and precautions according to the rx info reviewed with pt.  She will notify me of any SE/SS. I have reviewed/discussed the above normal BMI with the patient. I have recommended the following interventions: dietary management education, guidance, and counseling, encourage exercise, monitor weight and prescribed dietary intake . Clary Capps 2.  Labs    Latest results reviewed with patient   Routine monitoring labs ordered  Orders Placed This Encounter    EKG, 12 LEAD, INITIAL    liraglutide, weight loss, (SAXENDA) 3 mg/0.5 mL (18 mg/3 mL) pen    pen needle, diabetic (NovoFine Plus) 32 gauge x 1/6\" ndle       The primary encounter diagnosis was Dietary counseling and surveillance. Diagnoses of Encounter for weight loss counseling, Obesity (BMI 30-39.9), BMI 33.0-33.9,adult, Lupus ([address] and Essential hypertension were also pertinent to this visit. Follow-up and Dispositions    · Return in about 4 weeks (around 11/16/2021).          RHINA Barraza

## 2021-10-19 NOTE — PATIENT INSTRUCTIONS
[address],[facility]                                                           Brian Elizabeth                              Phone:                                                                      Fax:  ________________________________________________________________________________    Patient Name: Tiff MEDINA([id number])  Sex: Female  : 1990      [address] [place] [address]    [phone]    Primary Coverage                Secondary Coverage                Payor: [employer]                   No secondary coverage found. Plan: [facility] [employer] PPO                                               Subscriber Information                                            ID: [id number]                                                     Name: Pamela Baez                                         SSN: [id number]                                                  Address: [address] [place] [address]                                         Group No: [id number]                                                   [facility] Status:  ________________________________________________________________________________    [facility] Physician ID: [id number]     Ordering Physician: Dalton Ruffin    Order Specific Information  Order: EKG, 12 LEAD, INITIAL [CPT(R): 01425]  Order #: [id number]: [id number] FUTURE    Priority: Routine  Class: Ancillary Performed    Future Order Information      Expires on:2022            Expected by:10/20/2021                   Associated Diagnoses      Z71.3 Dietary counseling and surveillance      Z71.3 Encounter for weight loss counseling      E66.9 Obesity (BMI 30-39. 9)      Z68.33 BMI 33.0-33.9,adult      Reason for Exam: -> Pt would like to take phentermine, please eval for any                        arrhythmias.                      Start Date:10/19/21    Process Information:   Electronically Signed By:    Ordering Physician: <No UPIN>  Dior Fox    Printed: 10/19/21   4:06 PM

## 2021-10-27 DIAGNOSIS — Z71.3 ENCOUNTER FOR WEIGHT LOSS COUNSELING: ICD-10-CM

## 2021-10-27 DIAGNOSIS — Z72.4 INAPPROPRIATE DIET AND EATING HABITS: ICD-10-CM

## 2021-10-27 DIAGNOSIS — Z71.3 DIETARY COUNSELING AND SURVEILLANCE: Primary | ICD-10-CM

## 2021-10-27 DIAGNOSIS — E66.9 OBESITY (BMI 30-39.9): ICD-10-CM

## 2021-10-27 RX ORDER — TOPIRAMATE 25 MG/1
25 TABLET ORAL
Qty: 30 TABLET | Refills: 0 | Status: SHIPPED | OUTPATIENT
Start: 2021-10-27 | End: 2022-01-27

## 2021-11-17 ENCOUNTER — TELEPHONE (OUTPATIENT)
Dept: SURGERY | Age: 31
End: 2021-11-17

## 2021-11-17 NOTE — TELEPHONE ENCOUNTER
Patient requesting BP check. Patient states on last appt with Jerry Nye NP for LCD monthly provider visit her BP was elevated. Patient states her BP was checked 4 times and it was elevated all times. Patient states was told by nephrologist that her labs looked good and there was no need for an appt. patient states would like her BP checked again so medication phentermine can be prescribed. I advised patient  I will send message to NP. Patient verbalized understanding and stated to please call [phone].

## 2021-11-19 ENCOUNTER — TELEPHONE (OUTPATIENT)
Dept: SURGERY | Age: 31
End: 2021-11-19

## 2021-11-19 NOTE — TELEPHONE ENCOUNTER
LVM for patient to call office to schedule a bp check. Per Dianne Patient need to have records from nephrologist office to be sent to office.

## 2021-11-23 ENCOUNTER — CLINICAL SUPPORT (OUTPATIENT)
Dept: SURGERY | Age: 31
End: 2021-11-23

## 2021-11-23 VITALS — OXYGEN SATURATION: 100 % | DIASTOLIC BLOOD PRESSURE: 91 MMHG | SYSTOLIC BLOOD PRESSURE: 136 MMHG | HEART RATE: 83 BPM

## 2021-11-23 DIAGNOSIS — Z71.3 DIETARY COUNSELING AND SURVEILLANCE: Primary | ICD-10-CM

## 2021-11-23 NOTE — PROGRESS NOTES
Patient presents today for a nurse visit. Blood pressure per Maximus Crystal NP    First reading @ 2:58pm 149/105  Second reading @ 3:05 pm 136/91    Patient states medication Topamax makes her very sick. Patient states she has severe headaches, stomach pains and nausea with this medication. Patient states please call back at any time to discuss medication and BP readings. Patient also states she has been trying to get in contact with the nephrology office with no success. I advised patient to sign a release of record form so I can fax it to their office and maybe we will receive a response that way. Patient verbalized understanding.

## 2021-12-07 DIAGNOSIS — E66.9 OBESITY (BMI 30-39.9): Primary | ICD-10-CM

## 2021-12-07 DIAGNOSIS — Z71.3 WEIGHT LOSS COUNSELING, ENCOUNTER FOR: ICD-10-CM

## 2021-12-07 RX ORDER — PHENTERMINE HYDROCHLORIDE 15 MG/1
15 CAPSULE ORAL
Qty: 15 CAPSULE | Refills: 0 | Status: SHIPPED | OUTPATIENT
Start: 2021-12-07 | End: 2022-01-27 | Stop reason: ALTCHOICE

## 2021-12-07 NOTE — PROGRESS NOTES
Have not received records from nephrologist's office. Pt reports she has not been taking her BP medication regularly which could influence readings. Had SE with Topamax (nausea, HA) and is requesting alternative. Will prescribe phentermine 15 mg prn for appetite suppression, no more than 3 x weekly. Discussed warning signs for pt (Alarming HA, chest pain, vision symptoms) to stop medication and go to ER. Pt verbalized understanding of plan. Follow up in office in 4 weeks.

## 2022-01-27 ENCOUNTER — OFFICE VISIT (OUTPATIENT)
Dept: SURGERY | Age: 32
End: 2022-01-27
Payer: COMMERCIAL

## 2022-01-27 VITALS
HEIGHT: 59 IN | HEART RATE: 82 BPM | SYSTOLIC BLOOD PRESSURE: 143 MMHG | TEMPERATURE: 97.2 F | OXYGEN SATURATION: 99 % | WEIGHT: 178 LBS | BODY MASS INDEX: 35.88 KG/M2 | DIASTOLIC BLOOD PRESSURE: 82 MMHG

## 2022-01-27 DIAGNOSIS — Z71.3 ENCOUNTER FOR WEIGHT LOSS COUNSELING: ICD-10-CM

## 2022-01-27 DIAGNOSIS — Z71.3 DIETARY COUNSELING AND SURVEILLANCE: Primary | ICD-10-CM

## 2022-01-27 DIAGNOSIS — E66.9 OBESITY (BMI 30-39.9): ICD-10-CM

## 2022-01-27 PROCEDURE — 99212 OFFICE O/P EST SF 10 MIN: CPT | Performed by: NURSE PRACTITIONER

## 2022-01-27 RX ORDER — PHENTERMINE HYDROCHLORIDE 37.5 MG/1
37.5 TABLET ORAL
Qty: 30 TABLET | Refills: 0 | Status: SHIPPED | OUTPATIENT
Start: 2022-01-27 | End: 2022-02-22 | Stop reason: SDUPTHER

## 2022-01-27 NOTE — PROGRESS NOTES
[facility] Progress Note:   F/up Provider Visit    CC: Weight Management    Svetlana Farias is a 33 y.o. female who is here for her  f/up medical provider visit for the LCD Program. she did not attend class last week. Phentermine 15 mg did not assist with appetite suppression. Goal is to get to 150 lbs  Saw PCP 1/4, BP in office was 128/68, would like to restart phentermine 37.5 mg daily. Did you have any problems adhering to the program last week? no  If yes, please explain:     Since your last visit, have you experienced any complications? no    Have you received any other medical care this week? no  If yes, where and for what? Have you had any change in your medications since your last visit? no  If yes what? Are you taking an appetite suppressant? no   If yes:  Do you need a refill? BP Readings from Last 3 Encounters:   01/27/22 (!) 143/82   11/23/21 (!) 136/91   10/19/21 (!) 147/116        Eating Habits Over Last Week:  Did you take in 64 oz of non-caloric fluids? yes     Did you consume your prescribed meal replacement regimen each day? no   MR 1     Physical Activity Over the Past Week:    Aerobic exercise: 30 min  Resistance exercise: 0 workouts / week      Weight History  Weight Metrics 1/27/2022 1/27/2022 10/19/2021 10/19/2021 7/15/2021 7/15/2021 6/1/2021   Weight - 178 lb - 168 lb - 168 lb 175 lb 1.6 oz   Waist Measure Inches 36.5 - 34 - 34 - -   Body Fat % - - - - - - -   BMI - 35.95 kg/m2 - 33.93 kg/m2 - 33.93 kg/m2 35.37 kg/m2       Ideal body weight: 48.8 kg (107 lb 8.4 oz)  Adjusted ideal body weight: 61.6 kg (135 lb 11.4 oz)  Body mass index is 35.95 kg/m².       History    Past Medical History:   Diagnosis Date    [facility] JAN 2015    CERVICAL--HAD CHEMO & RADIATION    Hypertension     [facility]     Nephritis     Stroke ([facility]     nov.-2013       Past Surgical History:   Procedure Laterality Date    HX GYN      Brachy therapy    HX OTHER SURGICAL      fatty tissue from abdomen removed    AR EXC TUMOR SOFT TISSUE ABDOMINAL WALL SUBQ 3+CM  12/16/2015    Dr. Varun Kumari  Left and Right side abdomen       Current Outpatient Medications   Medication Sig Dispense Refill    phentermine (ADIPEX-P) 37.5 mg tablet Take 1 Tablet by mouth every morning. Max Daily Amount: 37.5 mg. 30 Tablet 0    hydrOXYchloroQUINE (PLAQUENIL) 200 mg tablet       losartan (COZAAR) 25 mg tablet       Xarelto 20 mg tab tablet       predniSONE (DELTASONE) 10 mg tablet Take 8 mg by mouth daily (with breakfast).  mycophenolate (CELLCEPT) 500 mg tablet Take 500 mg by mouth two (2) times a day. Allergies   Allergen Reactions    Flagyl [Metronidazole] Nausea Only    Keflex [Cephalexin] Rash    Macrobid [Nitrofurantoin Monohyd/M-Cryst] Nausea Only    Vancomycin Nausea Only       Social History     Tobacco Use    Smoking status: Never Smoker    Smokeless tobacco: Never Used   Vaping Use    Vaping Use: Never used   Substance Use Topics    Alcohol use: Not Currently     Alcohol/week: 0.0 standard drinks     Comment: 1x per month    Drug use: No       Family History   Problem Relation Age of Onset    Hypertension Father        Family Status   Relation Name Status    Father  (Not Specified)         Review of Systems  Review of Systems   Constitutional: Positive for malaise/fatigue. Weight gain   Cardiovascular: Negative for chest pain and palpitations. Gastrointestinal: Negative. Neurological: Negative. All other systems reviewed and are negative. Objective  Visit Vitals  BP (!) 143/82 (BP 1 Location: Right lower arm, BP Patient Position: Sitting) Comment (BP 1 Location): manual   Pulse 82   Temp 97.2 °F (36.2 °C)   Ht 4' 11\" (1.499 m)   Wt 80.7 kg (178 lb)   SpO2 99%   BMI 35.95 kg/m²     No LMP recorded. (Menstrual status: Chemically Induced). Physical Exam  Physical Exam  Vitals and nursing note reviewed.    Constitutional: Appearance: Normal appearance. Pulmonary:      Effort: Pulmonary effort is normal.   Musculoskeletal:         General: Normal range of motion. Skin:     General: Skin is warm and dry. Neurological:      General: No focal deficit present. Mental Status: She is alert and oriented to person, place, and time. Psychiatric:         Mood and Affect: Mood normal.         Behavior: Behavior normal.         No results found for this or any previous visit (from the past 672 hour(s)). Assessment / Plan    Encounter Diagnoses   Name Primary?  Dietary counseling and surveillance Yes    Encounter for weight loss counseling     Obesity (BMI 30-39. 9)     BMI 35.0-35.9,adult        1. Weight management      Today, the patient is  Height: 4' 11\" (149.9 cm) tall, Weight: 80.7 kg (178 lb) lbs for a Body mass index is 35.95 kg/m². is suffering from obesity      Degree of control: Will continue with LCD. Manual BP was 143/82 and since BP at PCP was wnl, will re-start phentermine 37.5 mg.    Progress was reviewed with patient. Goal(s) for next appointment:   -4 lbs. I have reviewed/discussed the above normal BMI with the patient. I have recommended the following interventions: dietary management education, guidance, and counseling, encourage exercise, monitor weight and prescribed dietary intake . Ch[facility] Orders Placed This Encounter    phentermine (ADIPEX-P) 37.5 mg tablet       The primary encounter diagnosis was Dietary counseling and surveillance. Diagnoses of Encounter for weight loss counseling, Obesity (BMI 30-39.9), and BMI 35.0-35.9,adult were also pertinent to this visit.       RHINA Spicer

## 2022-01-28 RX ORDER — CYANOCOBALAMIN 1000 UG/ML
1000 INJECTION, SOLUTION INTRAMUSCULAR; SUBCUTANEOUS ONCE
Qty: 1 ML | Refills: 0 | Status: CANCELLED
Start: 2022-01-28 | End: 2022-01-28

## 2022-02-22 ENCOUNTER — OFFICE VISIT (OUTPATIENT)
Dept: SURGERY | Age: 32
End: 2022-02-22
Payer: COMMERCIAL

## 2022-02-22 VITALS
TEMPERATURE: 97 F | OXYGEN SATURATION: 98 % | WEIGHT: 173 LBS | SYSTOLIC BLOOD PRESSURE: 130 MMHG | DIASTOLIC BLOOD PRESSURE: 90 MMHG | HEIGHT: 59 IN | HEART RATE: 90 BPM | BODY MASS INDEX: 34.88 KG/M2

## 2022-02-22 DIAGNOSIS — Z71.3 ENCOUNTER FOR WEIGHT LOSS COUNSELING: ICD-10-CM

## 2022-02-22 DIAGNOSIS — Z71.3 DIETARY COUNSELING AND SURVEILLANCE: ICD-10-CM

## 2022-02-22 DIAGNOSIS — E66.9 OBESITY (BMI 30-39.9): ICD-10-CM

## 2022-02-22 PROCEDURE — 99212 OFFICE O/P EST SF 10 MIN: CPT | Performed by: NURSE PRACTITIONER

## 2022-02-22 RX ORDER — PHENTERMINE HYDROCHLORIDE 37.5 MG/1
37.5 TABLET ORAL
Qty: 30 TABLET | Refills: 0 | Status: SHIPPED | OUTPATIENT
Start: 2022-02-22 | End: 2022-04-19 | Stop reason: SDUPTHER

## 2022-02-22 NOTE — PROGRESS NOTES
Vinay Cristina is a 33 y.o. female (: 1990) presenting to address:    Chief Complaint   Patient presents with    Weight Management     Med check       Medication list and allergies have been reviewed with Vinay Cristina and updated as of today's date. I have gone over all Medical, Surgical and Social History with Viany Cristina and updated/added the information accordingly. 1. Have you been to the ER, Urgent Care or Hospitalized since your last visit? NO      2. Have you followed up with your PCP or any other Physicians since your procedure/ last office visit?    NO

## 2022-02-22 NOTE — PROGRESS NOTES
[facility] Progress Note:   F/up Provider Visit    CC: Weight Management    Obie Byrnes is a 33 y.o. female who is here for her  f/up medical provider visit for the LCD Program. she did not attend class last week. -5 lbs  Doing well on phentermine, denies any SE and desires to continue. BP Readings from Last 3 Encounters:   02/22/22 (!) 130/90   01/27/22 (!) 143/82   11/23/21 (!) 136/91        Eating Habits Over Last Week:  Did you take in 64 oz of non-caloric fluids? yes       Physical Activity Over the Past Week:    Aerobic exercise: 30 min  Resistance exercise: 0 workouts / week      Weight History  Weight Metrics 2/22/2022 1/27/2022 1/27/2022 10/19/2021 10/19/2021 7/15/2021 7/15/2021   Weight 173 lb - 178 lb - 168 lb - 168 lb   Waist Measure Inches - 36.5 - 34 - 34 -   Body Fat % - - - - - - -   BMI 34.94 kg/m2 - 35.95 kg/m2 - 33.93 kg/m2 - 33.93 kg/m2       Ideal body weight: 48.8 kg (107 lb 8.4 oz)  Adjusted ideal body weight: 60.7 kg (133 lb 11.4 oz)  Body mass index is 34.94 kg/m². History    Past Medical History:   Diagnosis Date    [facility] JAN 2015    CERVICAL--HAD CHEMO & RADIATION    Hypertension     Lupus ([facility]     Nephritis     Stroke ([facility]     nov.-2013       Past Surgical History:   Procedure Laterality Date    HX GYN      Brachy therapy    HX OTHER SURGICAL      fatty tissue from abdomen removed    [facility] EXC TUMOR SOFT TISSUE ABDOMINAL WALL SUBQ 3+CM  12/16/2015    Dr. Liberty Gutierrez  Left and Right side abdomen       Current Outpatient Medications   Medication Sig Dispense Refill    phentermine (ADIPEX-P) 37.5 mg tablet Take 1 Tablet by mouth every morning. Max Daily Amount: 37.5 mg. 30 Tablet 0    hydrOXYchloroQUINE (PLAQUENIL) 200 mg tablet       losartan (COZAAR) 25 mg tablet       Xarelto 20 mg tab tablet       predniSONE (DELTASONE) 10 mg tablet Take 8 mg by mouth daily (with breakfast).       mycophenolate (CELLCEPT) 500 mg tablet Take 500 mg by mouth two (2) times a day. Allergies   Allergen Reactions    Flagyl [Metronidazole] Nausea Only    Keflex [Cephalexin] Rash    Macrobid [Nitrofurantoin Monohyd/M-Cryst] Nausea Only    Vancomycin Nausea Only       Social History     Tobacco Use    Smoking status: Never Smoker    Smokeless tobacco: Never Used   Vaping Use    Vaping Use: Never used   Substance Use Topics    Alcohol use: Not Currently     Alcohol/week: 0.0 standard drinks     Comment: 1x per month    Drug use: No       Family History   Problem Relation Age of Onset    Hypertension Father        Family Status   Relation Name Status    Father  (Not Specified)         Review of Systems  Review of Systems   Constitutional: Positive for malaise/fatigue and weight loss. Cardiovascular: Negative for chest pain and palpitations. Gastrointestinal: Negative. Neurological: Negative. All other systems reviewed and are negative. Objective  Visit Vitals  BP (!) 130/90 (BP 1 Location: Left upper arm, BP Patient Position: Sitting)   Pulse 90   Temp 97 °F (36.1 °C)   Ht 4' 11\" (1.499 m)   Wt 78.5 kg (173 lb)   SpO2 98%   BMI 34.94 kg/m²     No LMP recorded. (Menstrual status: Chemically Induced). Physical Exam  Physical Exam  Vitals and nursing note reviewed. Constitutional:       Appearance: She is obese. Pulmonary:      Effort: Pulmonary effort is normal.   Musculoskeletal:         General: Normal range of motion. Skin:     General: Skin is warm and dry. Neurological:      General: No focal deficit present. Mental Status: She is alert and oriented to person, place, and time. Psychiatric:         Mood and Affect: Mood normal.         Behavior: Behavior normal.         No results found for this or any previous visit (from the past 672 hour(s)). Assessment / Plan    Encounter Diagnoses   Name Primary?     Dietary counseling and surveillance     Encounter for weight loss counseling     Obesity (BMI 30-39. 9)     BMI 35.0-35.9,adult        1. Weight management      Today, the patient is  Height: 4' 11\" (149.9 cm) tall, Weight: 78.5 kg (173 lb) lbs for a Body mass index is 34.94 kg/m². is suffering from obesity      Degree of control: Will continue with LCD, doing well over past month. Pt thinks she can get a little more aggressive with exercise and eating a little better. Continue with phentermine, refill placed. She will get BP cuff and take BP at home. Progress was reviewed with patient. Goal(s) for next appointment:   -8 lbs    I have reviewed/discussed the above normal BMI with the patient. I have recommended the following interventions: dietary management education, guidance, and counseling, encourage exercise, monitor weight and prescribed dietary intake . Tamiko Moise Orders Placed This Encounter    phentermine (ADIPEX-P) 37.5 mg tablet       Diagnoses of Dietary counseling and surveillance, Encounter for weight loss counseling, Obesity (BMI 30-39.9), and BMI 35.0-35.9,adult were pertinent to this visit.     Akosua Pope, [email or web address]

## 2022-03-20 PROBLEM — N90.89 VULVAR LESION: Status: ACTIVE | Noted: 2017-04-04

## 2022-03-29 ENCOUNTER — TELEPHONE (OUTPATIENT)
Dept: SURGERY | Age: 32
End: 2022-03-29

## 2022-03-29 NOTE — TELEPHONE ENCOUNTER
Patient called to cancel appointment - requested that [facility] give her a call back. Patient states that once she gets a call from [facility] she will reschedule appt.

## 2022-04-19 ENCOUNTER — OFFICE VISIT (OUTPATIENT)
Dept: SURGERY | Age: 32
End: 2022-04-19
Payer: COMMERCIAL

## 2022-04-19 VITALS
BODY MASS INDEX: 34.88 KG/M2 | DIASTOLIC BLOOD PRESSURE: 99 MMHG | TEMPERATURE: 97.3 F | HEIGHT: 59 IN | SYSTOLIC BLOOD PRESSURE: 140 MMHG | WEIGHT: 173 LBS | OXYGEN SATURATION: 98 % | HEART RATE: 91 BPM

## 2022-04-19 DIAGNOSIS — Z71.3 DIETARY COUNSELING AND SURVEILLANCE: Primary | ICD-10-CM

## 2022-04-19 DIAGNOSIS — E66.9 OBESITY (BMI 30-39.9): ICD-10-CM

## 2022-04-19 DIAGNOSIS — Z71.3 ENCOUNTER FOR WEIGHT LOSS COUNSELING: ICD-10-CM

## 2022-04-19 PROCEDURE — 99213 OFFICE O/P EST LOW 20 MIN: CPT | Performed by: NURSE PRACTITIONER

## 2022-04-19 RX ORDER — PHENTERMINE HYDROCHLORIDE 37.5 MG/1
37.5 TABLET ORAL
Qty: 30 TABLET | Refills: 0 | Status: SHIPPED | OUTPATIENT
Start: 2022-04-19

## 2022-04-19 NOTE — PROGRESS NOTES
[facility] Progress Note:   F/up Provider Visit    CC: Weight Management    Christiano Coronado is a 33 y.o. female who is here for her  f/up medical provider visit for the LCD Program. she did not attend class last week. -0 lbs, maintaining well. Doing well on phentermine, denies any SE and desires to continue. Has not taken since before surgery. Laser vulva lesion destruction on 4/5, no exercise for 4-6 weeks. Got BP cuff, but has not taken at home. Taking when at stores when available and from multiple doctor visits at oncology and PCP. BP Readings from Last 3 Encounters:   04/19/22 (!) 140/99   02/22/22 (!) 130/90   01/27/22 (!) 143/82        Eating Habits Over Last Week:  Did you take in 64 oz of non-caloric fluids? yes       Physical Activity Over the Past Week:    Aerobic exercise: 0 min  Resistance exercise: 0 workouts / week      Weight History  Weight Metrics 4/19/2022 2/22/2022 1/27/2022 1/27/2022 10/19/2021 10/19/2021 7/15/2021   Weight 173 lb 173 lb - 178 lb - 168 lb -   Waist Measure Inches - - 36.5 - 34 - 34   Body Fat % - - - - - - -   BMI 34.94 kg/m2 34.94 kg/m2 - 35.95 kg/m2 - 33.93 kg/m2 -       Ideal body weight: 48.8 kg (107 lb 8.4 oz)  Adjusted ideal body weight: 60.7 kg (133 lb 11.4 oz)  Body mass index is 34.94 kg/m². History    Past Medical History:   Diagnosis Date    [facility] ([facility] JAN 2015    CERVICAL--HAD CHEMO & RADIATION    Hypertension     [facility]     Nephritis     Stroke ([facility]     nov.-2013       Past Surgical History:   Procedure Laterality Date    HX GYN      Brachy therapy    HX OTHER SURGICAL      fatty tissue from abdomen removed    [facility] EXC TUMOR SOFT TISSUE ABDOMINAL WALL SUBQ 3+CM  12/16/2015    Dr. Melany Fan  Left and Right side abdomen       Current Outpatient Medications   Medication Sig Dispense Refill    phentermine (ADIPEX-P) 37.5 mg tablet Take 1 Tablet by mouth every morning.  Max Daily Amount: 37.5 mg. 30 Tablet 0    hydrOXYchloroQUINE (PLAQUENIL) 200 mg tablet       losartan (COZAAR) 25 mg tablet       Xarelto 20 mg tab tablet       predniSONE (DELTASONE) 10 mg tablet Take 8 mg by mouth daily (with breakfast).  mycophenolate (CELLCEPT) 500 mg tablet Take 500 mg by mouth two (2) times a day. Allergies   Allergen Reactions    Flagyl [Metronidazole] Nausea Only    Keflex [Cephalexin] Rash    Macrobid [Nitrofurantoin Monohyd/M-Cryst] Nausea Only    Vancomycin Nausea Only       Social History     Tobacco Use    Smoking status: Never Smoker    Smokeless tobacco: Never Used   Vaping Use    Vaping Use: Never used   Substance Use Topics    Alcohol use: Not Currently     Alcohol/week: 0.0 standard drinks     Comment: 1x per month    Drug use: No       Family History   Problem Relation Age of Onset    Hypertension Father        Family Status   Relation Name Status    Father  (Not Specified)         Review of Systems  Review of Systems   Constitutional: Positive for malaise/fatigue. Negative for weight loss. Cardiovascular: Negative for chest pain and palpitations. Gastrointestinal: Negative. Neurological: Negative. All other systems reviewed and are negative. Objective  Visit Vitals  BP (!) 140/99 (BP 1 Location: Left arm, BP Patient Position: Sitting, BP Cuff Size: Large adult)   Pulse 91   Temp 97.3 °F (36.3 °C) (Temporal)   Ht 4' 11\" (1.499 m)   Wt 78.5 kg (173 lb)   SpO2 98%   BMI 34.94 kg/m²     No LMP recorded. (Menstrual status: Chemically Induced). Physical Exam  Physical Exam  Vitals and nursing note reviewed. Constitutional:       Appearance: She is obese. Pulmonary:      Effort: Pulmonary effort is normal.   Musculoskeletal:         General: Normal range of motion. Skin:     General: Skin is warm and dry. Neurological:      General: No focal deficit present. Mental Status: She is alert and oriented to person, place, and time.    Psychiatric:         Mood and Affect: Mood normal.         Behavior: Behavior normal.         No results found for this or any previous visit (from the past 672 hour(s)). Assessment / Plan    Encounter Diagnoses   Name Primary?  Dietary counseling and surveillance Yes    Encounter for weight loss counseling     Obesity (BMI 30-39. 9)     BMI 34.0-34.9,adult        1. Weight management      Today, the patient is  Height: 4' 11\" (149.9 cm) tall, Weight: 78.5 kg (173 lb) lbs for a Body mass index is 34.94 kg/m². is suffering from obesity      Degree of control: Will continue with LCD, has maintained since last visit even with stressors. Incorporate exercise once cleared by surgeon. Continue with phentermine, refill placed. Discussed to take BP at home. 127/93 and 131/79 on 4/5 at [facility], labs and EKG reviewed in [address] was reviewed with patient. Goal(s) for next appointment:   -4 lbs  Going to [email or web address] in June, would like to be down 10 lbs before then. I have reviewed/discussed the above normal BMI with the patient. I have recommended the following interventions: dietary management education, guidance, and counseling, encourage exercise, monitor weight and prescribed dietary intake . Sadiq Messina Placed This Encounter    phentermine (ADIPEX-P) 37.5 mg tablet       The primary encounter diagnosis was Dietary counseling and surveillance. Diagnoses of Encounter for weight loss counseling, Obesity (BMI 30-39.9), and BMI 34.0-34.9,adult were also pertinent to this visit.     RHINA Will

## 2022-04-19 NOTE — PROGRESS NOTES
Vicente Lam is a 33 y.o. female (: 1990) presenting to address:    Chief Complaint   Patient presents with    Follow-up     2 month follow up/ Med check/ last seen 22       Medication list and allergies have been reviewed with Vicente Lam and updated as of today's date. I have gone over all Medical, Surgical and Social History with Vicente Lam and updated/added the information accordingly. 1. Have you been to the ER, Urgent Care or Hospitalized since your last visit? NO      2. Have you followed up with your PCP or any other Physicians since your procedure/ last office visit? YES.  Dr. Tarry Kocher Surgery 22

## 2022-12-30 ENCOUNTER — OFFICE VISIT (OUTPATIENT)
Dept: SURGERY | Age: 32
End: 2022-12-30
Payer: COMMERCIAL

## 2022-12-30 ENCOUNTER — HOSPITAL ENCOUNTER (OUTPATIENT)
Dept: LAB | Age: 32
Discharge: HOME | End: 2022-12-30
Payer: COMMERCIAL

## 2022-12-30 VITALS
SYSTOLIC BLOOD PRESSURE: 140 MMHG | WEIGHT: 186 LBS | TEMPERATURE: 97.8 F | BODY MASS INDEX: 37.5 KG/M2 | HEIGHT: 59 IN | OXYGEN SATURATION: 99 % | HEART RATE: 91 BPM | RESPIRATION RATE: 16 BRPM | DIASTOLIC BLOOD PRESSURE: 90 MMHG

## 2022-12-30 DIAGNOSIS — E66.9 OBESITY (BMI 30-39.9): Primary | ICD-10-CM

## 2022-12-30 DIAGNOSIS — E66.9 OBESITY (BMI 30-39.9): ICD-10-CM

## 2022-12-30 DIAGNOSIS — Z71.3 ENCOUNTER FOR WEIGHT LOSS COUNSELING: ICD-10-CM

## 2022-12-30 DIAGNOSIS — Z72.4 INAPPROPRIATE DIET OR EATING HABITS: ICD-10-CM

## 2022-12-30 PROCEDURE — 99214 OFFICE O/P EST MOD 30 MIN: CPT | Performed by: NURSE PRACTITIONER

## 2022-12-30 PROCEDURE — 3074F SYST BP LT 130 MM HG: CPT | Performed by: NURSE PRACTITIONER

## 2022-12-30 PROCEDURE — 3078F DIAST BP <80 MM HG: CPT | Performed by: NURSE PRACTITIONER

## 2022-12-30 PROCEDURE — 93005 ELECTROCARDIOGRAM TRACING: CPT

## 2022-12-30 RX ORDER — PHENTERMINE HYDROCHLORIDE 37.5 MG/1
37.5 TABLET ORAL
Qty: 30 TABLET | Refills: 2 | Status: SHIPPED | OUTPATIENT
Start: 2022-12-30

## 2022-12-30 NOTE — PROGRESS NOTES
Weight Loss Progress Note: Initial Physician Visit      Johanna Wen is a 27 y.o. female with BMI 37 who is here for her Initial Evaluation for the medical weight loss LCD program. Patient has done the VLCD program before and was able to successfully lose 30lbs, but missed an appointment and thereafter reverted to her previous ways of eating and has begun to regain the weight. CC: Obesity    Weight History  Current weight 186lbs  Goal weight 125lbs   Highest weight 186lbs   (See weight gain time line scanned into media section of chart)    Food intolerances (gas, bloating, diarrhea, vomiting)? Dairy. How many meals per day? 2 meals. Usual meal times? 11a-12p, 9p-10p with snacking after dinner. Skipped meals? Yes. Which meals are skipped? Breakfast        How often? Regular basis. Who cooks/makes your meals? Self. Who shops/buys your food? Self. Weight loss History  How many weight loss attempts have you had?  2 times. Which program were you most successful doing? Phentermine and LCD, VLCD. Lost about 30 pounds. Significant Medical History    Have you ever taken appetite suppressants? Yes. If yes: Rx or OTC? Phentermine. If yes; Any negative side effects? Ever diagnosed with sleep apnea or put on CPAP? No.    Ever had bariatric surgery?: No.    Pregnant or planning on becoming pregnant w/in 6 months: No.        Significant Psychosocial History   Any history of drug abuse or dependence: No.  Current Major Lifestyle Changes: Hypertension. Any potential unsupportive people: Why are you starting a weight loss program now? Patient desires to improve her body image as she does not like the way she looks in the mirror and also wants to get her health under control. Are you ready? Yes. History of binge eating disorder or anorexia: No.  If yes, are you currently being treated?     Social History  Social History     Tobacco Use    Smoking status: Never    Smokeless tobacco: Never   Substance Use Topics    Alcohol use: Not Currently     Alcohol/week: 0.0 standard drinks     Comment: 1x per month     How many times a week do you eat out? Once. Do you drink any EtOH? Yes. If so, how much? Once a month/occasional    Do you have upcoming any travel in the next 6 weeks? No.   If so, what do you have planned? N/A      Exercise  How many days a week do you currently exercise? 30 mins walking every day. Have you ever been told by a physician not to exercise? Objective  Visit Vitals  BP (!) 140/90 (BP Patient Position: Sitting)   Pulse 91   Temp 97.8 °F (36.6 °C) (Temporal)   Resp 16   Ht 4' 11\" (1.499 m)   Wt 186 lb (84.4 kg)   SpO2 99%   BMI 37.57 kg/m²       Waist Circumference: 37in  Arm Circumference: 14.5in  Thigh Circumference: 20in  Percent Body Fat: 41.2%  Weight Metrics 12/30/2022 4/19/2022 2/22/2022 1/27/2022 1/27/2022 10/19/2021 10/19/2021   Weight 186 lb 173 lb 173 lb - 178 lb - 168 lb   Waist Measure Inches - - - 36.5 - 34 -   Body Fat % - - - - - - -   BMI 37.57 kg/m2 34.94 kg/m2 34.94 kg/m2 - 35.95 kg/m2 - 33.93 kg/m2         Labs: None    Review of Systems  Review of Systems   Constitutional: Negative. HENT: Negative. Eyes: Negative. Respiratory: Negative. Cardiovascular: Negative. Gastrointestinal: Negative. Genitourinary: Negative. Musculoskeletal: Negative. Skin: Negative. Neurological: Negative. Endo/Heme/Allergies: Negative. Psychiatric/Behavioral: Negative. Physical Exam    Physical Exam  Constitutional:       Appearance: She is obese. HENT:      Head: Normocephalic and atraumatic. Mouth/Throat:      Mouth: Mucous membranes are moist.   Eyes:      Extraocular Movements: Extraocular movements intact. Pupils: Pupils are equal, round, and reactive to light. Cardiovascular:      Rate and Rhythm: Normal rate and regular rhythm. Pulses: Normal pulses. Heart sounds: Normal heart sounds. Pulmonary:      Effort: Pulmonary effort is normal.      Breath sounds: Normal breath sounds. Abdominal:      Palpations: Abdomen is soft. Musculoskeletal:         General: Normal range of motion. Cervical back: Normal range of motion and neck supple. Skin:     General: Skin is warm and dry. Neurological:      General: No focal deficit present. Mental Status: She is alert and oriented to person, place, and time. Psychiatric:         Mood and Affect: Mood normal.         Behavior: Behavior normal.          Assessment & Plan  Based on his history and exam, Rubina Kc is a good candidate for the Non-[facility] Weight Loss LCD Program. Patient has done the VLCD program along with phentermine in the very recent past and was able to lose 30lbs, but eventually reverted to her old eating habits and has begun to regain the weight. She wants to lose the weight to be healthy and to be able to cross her legs when she's sitting down with ease. Dietary recall reflects that a typical breakfast for the patient is the one meal that she consistently skips. A typical lunch consists of a salad with croutons, cucumbers, banana peppers and [place] dressing. Kathy Jalloh is typically a protein such as honey glazed butter salmon over rice with a vegetable and diet coke. Patient states that her weakness food-wise is bread and rice. She consumes approximately 32oz of water on an inconsistent basis and consumes 3 diet cokes per week. She denies drinking sweet tea and is a social drinker of alcohol. Patient states that she walks twice per week at her local gym for 30mins, but otherwise has no other exercise regimen. Discussed with patient that the LCD program is a good fit for her, since she has done the VLCD program in the past this one allows for a little more calories and does not require purchasing the BP cuff or scale.  Explained that she will have a meal replacement shake for breakfast negating her routine of skipping breakfast and giving her body fuel to work on throughout the day. Patient has lupus for which she takes steroids and this will help minimize blood sugar spikes from consumption of carbs, starches, and sugars. Encouraged patient to increase her water intake to at least 48oz of water daily which can be flavored with crystal light, [place], or zipp fizz. Advised patient to increase her walking to 3x weekly for 30mins and for one of those days to be walking on a treadmill inclined so that he body doesn't simply rely on muscle memory. Diet Plan: LCD      Activity Plan: Walking 30mins 3x weekly    Medication Plan: Phentermine      There are no Patient Instructions on file for this visit. There were no encounter diagnoses.         LISA Diaz

## 2022-12-31 LAB
ATRIAL RATE: 83 BPM
CALCULATED P AXIS, ECG09: 29 DEGREES
CALCULATED R AXIS, ECG10: 31 DEGREES
CALCULATED T AXIS, ECG11: 20 DEGREES
DIAGNOSIS, 93000: NORMAL
P-R INTERVAL, ECG05: 148 MS
Q-T INTERVAL, ECG07: 380 MS
QRS DURATION, ECG06: 80 MS
QTC CALCULATION (BEZET), ECG08: 446 MS
VENTRICULAR RATE, ECG03: 83 BPM

## 2024-03-14 PROBLEM — Z98.890 STATUS POST BIOPSY OF KIDNEY: Status: ACTIVE | Noted: 2024-03-14

## (undated) DEVICE — SUTURE VCRL SZ 3-0 L18IN ABSRB UD POLYGLACTIN 910 BRAID TIE J910T

## (undated) DEVICE — SUTURE VCRL SZ 3-0 L18IN ABSRB UD L26MM SH 1/2 CIR J864D

## (undated) DEVICE — LIGHT HANDLE: Brand: DEVON

## (undated) DEVICE — TRAY PREP DRY W/ PREM GLV 2 APPL 6 SPNG 2 UNDPD 1 OVERWRAP

## (undated) DEVICE — NEEDLE HYPO 27GA L1.25IN GRY POLYPR HUB S STL REG BVL STR

## (undated) DEVICE — SUTURE VCRL SZ 2-0 L12X18IN ABSRB UD POLYGLACTIN 910 BRAID J911T

## (undated) DEVICE — ZINACTIVE USE 2641837 CLIP LIG M BLU TI HRT SHP WIRE HORZ 600 PER BX

## (undated) DEVICE — SYR 10ML CTRL LR LCK NSAF LF --

## (undated) DEVICE — DRAPE XR C ARM MOB SURG 44X72 --

## (undated) DEVICE — FLEX ADVANTAGE 3000CC: Brand: FLEX ADVANTAGE

## (undated) DEVICE — HEX-LOCKING BLADE ELECTRODE: Brand: EDGE

## (undated) DEVICE — SOLUTION IRRIG 1000ML H2O STRL BLT

## (undated) DEVICE — STERILE LATEX POWDER-FREE SURGICAL GLOVESWITH NITRILE COATING: Brand: PROTEXIS

## (undated) DEVICE — DERMABOND SKIN ADH 0.7ML -- DERMABOND ADVANCED 12/BX

## (undated) DEVICE — (D)PACK ICE DISP -- DISC BY MFR

## (undated) DEVICE — SPONGE: SPECIALTY CHERRY DISS XR 100/CS: Brand: MEDICAL ACTION INDUSTRIES

## (undated) DEVICE — SUT CHRMC 3-0 27IN SH BRN --

## (undated) DEVICE — CLIP INT SM WIDE RED TI TRNSVRS GRV CHEVRON SHP W PRECIS

## (undated) DEVICE — (D)GLOVE SURG TRIFLX 8 PWD LTX -- DISC BY MFR USE ITEM 302994

## (undated) DEVICE — 3M™ BAIR PAWS FLEX™ WARMING GOWN, STANDARD, 20 PER CASE 81003: Brand: BAIR PAWS™

## (undated) DEVICE — SUTURE VCRL SZ 0 L18IN ABSRB UD POLYGLACTIN 910 BRAID TIE J912G

## (undated) DEVICE — DRAIN WND 15FR RND HUBLESS SIL W/ 3/16IN TRCR BLAK

## (undated) DEVICE — SOLUTION IV 1000ML 0.9% SOD CHL

## (undated) DEVICE — TRAY CATH OD16FR SIL URIN M STATLOK STBL DEV SURSTP

## (undated) DEVICE — SKIN MARKER,REGULAR TIP WITH RULER AND LABELS: Brand: DEVON

## (undated) DEVICE — SUTURE VCRL SZ 2-0 L27IN ABSRB UD L26MM CT-2 1/2 CIR J269H

## (undated) DEVICE — SUTURE PERMAHAND SZ 2-0 L12X18IN NONABSORBABLE BLK SILK A185H

## (undated) DEVICE — DRAPE TWL SURG 16X26IN BLU ORB04] ALLCARE INC]

## (undated) DEVICE — SHEET, DRAPE, SPLIT, STERILE: Brand: MEDLINE

## (undated) DEVICE — STERILE COTTON TIPPED APPLICATORS: Brand: PURITAN

## (undated) DEVICE — SYRINGE MED 20ML STD CLR PLAS LUERLOCK TIP N CTRL DISP

## (undated) DEVICE — Z DISCONTINUED USE 2219801 STAPLER SKIN REG CRWN L5.7MM LEG L3.9MM WIRE DIA0.53MM PROX

## (undated) DEVICE — BLADE TNGE REG 6IN WOOD STRL -- CONVERT TO ITEM 153408

## (undated) DEVICE — SPONGE GZ W4XL4IN COT 12 PLY TYP VII WVN C FLD DSGN

## (undated) DEVICE — ARGYLE FRAZIER SURGICAL SUCTION INSTRUMENT 12 FR/CH (4.0 MM): Brand: ARGYLE

## (undated) DEVICE — KENDALL SCD EXPRESS SLEEVES, KNEE LENGTH, MEDIUM: Brand: KENDALL SCD

## (undated) DEVICE — TOWEL: OR BLU 80/CS: Brand: MEDICAL ACTION INDUSTRIES

## (undated) DEVICE — X-RAY DETECTABLE SPONGES,12 PLY: Brand: VISTEC

## (undated) DEVICE — SUTURE PERMAHAND SZ 3-0 L18IN NONABSORBABLE BLK SILK BRAID A184H

## (undated) DEVICE — CLIP LIG ADH PD W SLOT HORZ --

## (undated) DEVICE — JACKSON-PRATT 100CC BULB RESERVOIR: Brand: CARDINAL HEALTH

## (undated) DEVICE — (D)SYR 10ML 1/5ML GRAD NSAF -- PKGING CHANGE USE ITEM 338027

## (undated) DEVICE — STERILE POLYISOPRENE POWDER-FREE SURGICAL GLOVES: Brand: PROTEXIS

## (undated) DEVICE — Device

## (undated) DEVICE — INTENDED FOR TISSUE SEPARATION, AND OTHER PROCEDURES THAT REQUIRE A SHARP SURGICAL BLADE TO PUNCTURE OR CUT.: Brand: BARD-PARKER SAFETY BLADES SIZE 10, STERILE

## (undated) DEVICE — PACK PROCEDURE SURG MAJ W/ BASIN LF

## (undated) DEVICE — MEDI-VAC NON-CONDUCTIVE SUCTION TUBING: Brand: CARDINAL HEALTH

## (undated) DEVICE — SUTURE VCRL SZ 2-0 L27IN ABSRB UD L26MM SH 1/2 CIR J417H

## (undated) DEVICE — REM POLYHESIVE ADULT PATIENT RETURN ELECTRODE: Brand: VALLEYLAB

## (undated) DEVICE — TABLE COVER: Brand: CONVERTORS

## (undated) DEVICE — SUTURE VCRL SZ 3-0 L27IN ABSRB UD L26MM SH 1/2 CIR J416H